# Patient Record
Sex: FEMALE | Employment: UNEMPLOYED | URBAN - METROPOLITAN AREA
[De-identification: names, ages, dates, MRNs, and addresses within clinical notes are randomized per-mention and may not be internally consistent; named-entity substitution may affect disease eponyms.]

---

## 2017-10-21 ENCOUNTER — HOSPITAL ENCOUNTER (OUTPATIENT)
Dept: ULTRASOUND IMAGING | Facility: HOSPITAL | Age: 32
Discharge: HOME | End: 2017-10-21
Attending: NURSE PRACTITIONER

## 2017-10-21 ENCOUNTER — HOSPITAL ENCOUNTER (OUTPATIENT)
Dept: CT IMAGING | Facility: HOSPITAL | Age: 32
Discharge: HOME | End: 2017-10-21
Attending: NURSE PRACTITIONER

## 2017-10-21 DIAGNOSIS — R10.9 ABDOMINAL PAIN: ICD-10-CM

## 2017-10-21 PROCEDURE — 76700 US EXAM ABDOM COMPLETE: CPT | Performed by: NURSE PRACTITIONER

## 2017-10-27 ENCOUNTER — HOSPITAL ENCOUNTER (OUTPATIENT)
Dept: MAMMOGRAPHY | Age: 32
Discharge: HOME | End: 2017-10-27
Attending: NURSE PRACTITIONER
Payer: COMMERCIAL

## 2017-10-27 DIAGNOSIS — Z12.39 ENCOUNTER FOR SPECIAL SCREENING EXAMINATION FOR NEOPLASM OF BREAST: ICD-10-CM

## 2017-10-27 PROCEDURE — 77067 SCR MAMMO BI INCL CAD: CPT | Performed by: NURSE PRACTITIONER

## 2018-03-16 ENCOUNTER — OFFICE VISIT (OUTPATIENT)
Dept: OBGYN CLINIC | Facility: CLINIC | Age: 33
End: 2018-03-16

## 2018-03-16 VITALS — DIASTOLIC BLOOD PRESSURE: 80 MMHG | HEART RATE: 85 BPM | SYSTOLIC BLOOD PRESSURE: 117 MMHG | WEIGHT: 138 LBS

## 2018-03-16 DIAGNOSIS — N92.6 MISSED MENSES: Primary | ICD-10-CM

## 2018-03-16 LAB
CONTROL LINE PRESENT WITH A CLEAR BACKGROUND (YES/NO): YES YES/NO
KIT LOT #: NORMAL NUMERIC

## 2018-03-16 PROCEDURE — 81025 URINE PREGNANCY TEST: CPT | Performed by: OBSTETRICS & GYNECOLOGY

## 2018-03-16 PROCEDURE — 99203 OFFICE O/P NEW LOW 30 MIN: CPT | Performed by: OBSTETRICS & GYNECOLOGY

## 2018-03-16 RX ORDER — FERROUS SULFATE 325(65) MG
325 TABLET ORAL
Qty: 30 TABLET | Refills: 5 | Status: SHIPPED | OUTPATIENT
Start: 2018-03-16 | End: 2018-07-12

## 2018-03-16 RX ORDER — FERROUS SULFATE 325(65) MG
TABLET ORAL
Refills: 7 | COMMUNITY
Start: 2018-02-19 | End: 2018-06-13

## 2018-03-22 ENCOUNTER — NURSE ONLY (OUTPATIENT)
Dept: OBGYN CLINIC | Facility: CLINIC | Age: 33
End: 2018-03-22

## 2018-03-22 VITALS — HEIGHT: 55.5 IN

## 2018-03-22 DIAGNOSIS — Z86.32 HISTORY OF GESTATIONAL DIABETES IN PRIOR PREGNANCY, CURRENTLY PREGNANT: ICD-10-CM

## 2018-03-22 DIAGNOSIS — O09.299 HISTORY OF GESTATIONAL DIABETES IN PRIOR PREGNANCY, CURRENTLY PREGNANT: ICD-10-CM

## 2018-03-22 DIAGNOSIS — Z34.82 ENCOUNTER FOR SUPERVISION OF OTHER NORMAL PREGNANCY IN SECOND TRIMESTER: Primary | ICD-10-CM

## 2018-03-22 PROCEDURE — 99211 OFF/OP EST MAY X REQ PHY/QHP: CPT | Performed by: OBSTETRICS & GYNECOLOGY

## 2018-03-22 RX ORDER — PRENATAL VIT/IRON FUM/FOLIC AC 27MG-0.8MG
1 TABLET ORAL DAILY
COMMUNITY
End: 2018-05-10

## 2018-03-22 NOTE — PROGRESS NOTES
Pt here today for OB RN  Education visit. Pt currently 13w2d based on LMP. Educational material reviewed including nutrition,exercise, warning signs and precautions. Per pt has hx of gestational diabetes x2.  Primary  , due to breech presentat

## 2018-03-24 ENCOUNTER — LAB ENCOUNTER (OUTPATIENT)
Dept: LAB | Age: 33
End: 2018-03-24
Attending: OBSTETRICS & GYNECOLOGY
Payer: MEDICAID

## 2018-03-24 DIAGNOSIS — Z86.32 HISTORY OF GESTATIONAL DIABETES IN PRIOR PREGNANCY, CURRENTLY PREGNANT: ICD-10-CM

## 2018-03-24 DIAGNOSIS — Z34.82 ENCOUNTER FOR SUPERVISION OF OTHER NORMAL PREGNANCY IN SECOND TRIMESTER: ICD-10-CM

## 2018-03-24 DIAGNOSIS — O09.299 HISTORY OF GESTATIONAL DIABETES IN PRIOR PREGNANCY, CURRENTLY PREGNANT: ICD-10-CM

## 2018-03-24 LAB
ANTIBODY SCREEN: NEGATIVE
BASOPHILS # BLD: 0 K/UL (ref 0–0.2)
BASOPHILS NFR BLD: 0 %
BILIRUB UR QL: NEGATIVE
COLOR UR: YELLOW
EOSINOPHIL # BLD: 0.1 K/UL (ref 0–0.7)
EOSINOPHIL NFR BLD: 1 %
ERYTHROCYTE [DISTWIDTH] IN BLOOD BY AUTOMATED COUNT: 14.7 % (ref 11–15)
GLUCOSE 1H P 50 G GLC PO SERPL-MCNC: 206 MG/DL
GLUCOSE UR-MCNC: NEGATIVE MG/DL
HCT VFR BLD AUTO: 33.7 % (ref 35–48)
HGB BLD-MCNC: 11.3 G/DL (ref 12–16)
HGB UR QL STRIP.AUTO: NEGATIVE
KETONES UR-MCNC: NEGATIVE MG/DL
LYMPHOCYTES # BLD: 1.3 K/UL (ref 1–4)
LYMPHOCYTES NFR BLD: 18 %
MCH RBC QN AUTO: 27.2 PG (ref 27–32)
MCHC RBC AUTO-ENTMCNC: 33.5 G/DL (ref 32–37)
MCV RBC AUTO: 81.1 FL (ref 80–100)
MONOCYTES # BLD: 0.3 K/UL (ref 0–1)
MONOCYTES NFR BLD: 5 %
NEUTROPHILS # BLD AUTO: 5.4 K/UL (ref 1.8–7.7)
NEUTROPHILS NFR BLD: 76 %
NITRITE UR QL STRIP.AUTO: NEGATIVE
PH UR: 7 [PH] (ref 5–8)
PLATELET # BLD AUTO: 146 K/UL (ref 140–400)
PMV BLD AUTO: 11 FL (ref 7.4–10.3)
PROT UR-MCNC: NEGATIVE MG/DL
RBC # BLD AUTO: 4.15 M/UL (ref 3.7–5.4)
RBC #/AREA URNS AUTO: 1 /HPF
RH BLOOD TYPE: POSITIVE
RUBV IGG SER-ACNC: 11 IU/ML
SP GR UR STRIP: 1.02 (ref 1–1.03)
TSH SERPL-ACNC: 1.18 UIU/ML (ref 0.45–5.33)
UROBILINOGEN UR STRIP-ACNC: <2
VIT C UR-MCNC: NEGATIVE MG/DL
WBC # BLD AUTO: 7.1 K/UL (ref 4–11)
WBC #/AREA URNS AUTO: 1 /HPF

## 2018-03-24 PROCEDURE — 85025 COMPLETE CBC W/AUTO DIFF WBC: CPT

## 2018-03-24 PROCEDURE — 86850 RBC ANTIBODY SCREEN: CPT

## 2018-03-24 PROCEDURE — 84443 ASSAY THYROID STIM HORMONE: CPT

## 2018-03-24 PROCEDURE — 86780 TREPONEMA PALLIDUM: CPT

## 2018-03-24 PROCEDURE — 86901 BLOOD TYPING SEROLOGIC RH(D): CPT

## 2018-03-24 PROCEDURE — 81001 URINALYSIS AUTO W/SCOPE: CPT

## 2018-03-24 PROCEDURE — 86900 BLOOD TYPING SEROLOGIC ABO: CPT

## 2018-03-24 PROCEDURE — 82950 GLUCOSE TEST: CPT

## 2018-03-24 PROCEDURE — 87389 HIV-1 AG W/HIV-1&-2 AB AG IA: CPT

## 2018-03-24 PROCEDURE — 86762 RUBELLA ANTIBODY: CPT

## 2018-03-24 PROCEDURE — 87340 HEPATITIS B SURFACE AG IA: CPT

## 2018-03-24 PROCEDURE — 36415 COLL VENOUS BLD VENIPUNCTURE: CPT

## 2018-03-24 PROCEDURE — 87086 URINE CULTURE/COLONY COUNT: CPT

## 2018-03-26 LAB
HBV SURFACE AG SERPL QL IA: NONREACTIVE
HIV1+2 AB SERPL QL IA: NONREACTIVE
T PALLIDUM AB SER QL: NEGATIVE

## 2018-04-14 ENCOUNTER — INITIAL PRENATAL (OUTPATIENT)
Dept: OBGYN CLINIC | Facility: CLINIC | Age: 33
End: 2018-04-14

## 2018-04-14 VITALS
SYSTOLIC BLOOD PRESSURE: 109 MMHG | WEIGHT: 162 LBS | BODY MASS INDEX: 37 KG/M2 | HEART RATE: 85 BPM | DIASTOLIC BLOOD PRESSURE: 73 MMHG

## 2018-04-14 DIAGNOSIS — Z34.82 ENCOUNTER FOR SUPERVISION OF OTHER NORMAL PREGNANCY IN SECOND TRIMESTER: Primary | ICD-10-CM

## 2018-04-14 PROBLEM — O99.210 OBESITY AFFECTING PREGNANCY, ANTEPARTUM: Status: ACTIVE | Noted: 2018-04-14

## 2018-04-14 PROBLEM — O34.219 PREVIOUS CESAREAN DELIVERY AFFECTING PREGNANCY: Status: ACTIVE | Noted: 2018-04-14

## 2018-04-14 PROBLEM — Z30.2 REQUEST FOR STERILIZATION: Status: ACTIVE | Noted: 2018-04-14

## 2018-04-14 PROBLEM — O99.210 OBESITY AFFECTING PREGNANCY, ANTEPARTUM (HCC): Status: ACTIVE | Noted: 2018-04-14

## 2018-04-14 PROBLEM — O34.219 PREVIOUS CESAREAN DELIVERY AFFECTING PREGNANCY (HCC): Status: ACTIVE | Noted: 2018-04-14

## 2018-04-14 PROCEDURE — 0500F INITIAL PRENATAL CARE VISIT: CPT | Performed by: OBSTETRICS & GYNECOLOGY

## 2018-04-14 PROCEDURE — 81003 URINALYSIS AUTO W/O SCOPE: CPT | Performed by: OBSTETRICS & GYNECOLOGY

## 2018-04-14 NOTE — PROGRESS NOTES
Patient is unsure of QUAD screen, per pt declined with past pregnancies.  Patient would like more information in regards to QUAD screen

## 2018-04-14 NOTE — PROGRESS NOTES
No c/o. Thinking about Quad screen. Counseled. Interested in repeat c section and tubal sterilization. Discussed salpingectomy. Refer to DM center for GDM diet and monitoring. Check Hgb A1C. Says one year ago had neg DM screening.   rec obtain u/s re

## 2018-04-20 ENCOUNTER — APPOINTMENT (OUTPATIENT)
Dept: LAB | Age: 33
End: 2018-04-20
Attending: OBSTETRICS & GYNECOLOGY
Payer: MEDICAID

## 2018-04-20 DIAGNOSIS — Z34.82 ENCOUNTER FOR SUPERVISION OF OTHER NORMAL PREGNANCY IN SECOND TRIMESTER: ICD-10-CM

## 2018-04-20 PROCEDURE — 81511 FTL CGEN ABNOR FOUR ANAL: CPT

## 2018-04-20 PROCEDURE — 36415 COLL VENOUS BLD VENIPUNCTURE: CPT

## 2018-04-20 PROCEDURE — 83036 HEMOGLOBIN GLYCOSYLATED A1C: CPT

## 2018-04-24 ENCOUNTER — HOSPITAL ENCOUNTER (OUTPATIENT)
Dept: ENDOCRINOLOGY | Facility: HOSPITAL | Age: 33
Discharge: HOME | End: 2018-04-24
Attending: OBSTETRICS & GYNECOLOGY
Payer: MEDICAID

## 2018-04-24 VITALS — BODY MASS INDEX: 38 KG/M2 | WEIGHT: 165.13 LBS

## 2018-04-24 DIAGNOSIS — O24.410 DIET CONTROLLED GESTATIONAL DIABETES MELLITUS (GDM) IN SECOND TRIMESTER: Primary | ICD-10-CM

## 2018-04-24 NOTE — PROGRESS NOTES
Naveen Tijerina  : 1985 was seen for Gestational Diabetes Counseling: Individual/Group    Date: 2018   Start time: 9:40 am  End time: 11:20 am    Obtained usual diet history: no records    Education:     GDM Overview:  Reviewed gestation concerns. Patient verbalized understanding and has no further questions at this time.     Tory Bucio RN, CDE

## 2018-04-28 ENCOUNTER — HOSPITAL ENCOUNTER (OUTPATIENT)
Dept: ULTRASOUND IMAGING | Facility: HOSPITAL | Age: 33
Discharge: HOME | End: 2018-04-28
Attending: OBSTETRICS & GYNECOLOGY
Payer: MEDICAID

## 2018-04-28 DIAGNOSIS — Z34.82 ENCOUNTER FOR SUPERVISION OF OTHER NORMAL PREGNANCY IN SECOND TRIMESTER: ICD-10-CM

## 2018-04-28 PROCEDURE — 76805 OB US >/= 14 WKS SNGL FETUS: CPT | Performed by: OBSTETRICS & GYNECOLOGY

## 2018-04-30 ENCOUNTER — TELEPHONE (OUTPATIENT)
Dept: OBGYN CLINIC | Facility: CLINIC | Age: 33
End: 2018-04-30

## 2018-04-30 NOTE — TELEPHONE ENCOUNTER
----- Message from Andi Reinoso MD sent at 4/28/2018  8:35 PM CDT -----  Please inform patient by [address], mail, or call of normal OB ultrasound.

## 2018-05-03 ENCOUNTER — HOSPITAL ENCOUNTER (OUTPATIENT)
Dept: ENDOCRINOLOGY | Facility: HOSPITAL | Age: 33
Discharge: HOME | End: 2018-05-03
Attending: OBSTETRICS & GYNECOLOGY
Payer: MEDICAID

## 2018-05-03 VITALS — WEIGHT: 167.31 LBS | BODY MASS INDEX: 38 KG/M2

## 2018-05-03 DIAGNOSIS — O24.410 DIET CONTROLLED GESTATIONAL DIABETES MELLITUS (GDM) IN SECOND TRIMESTER: Primary | ICD-10-CM

## 2018-05-03 NOTE — PROGRESS NOTES
[facility]  : 1985 was seen for GDM Follow-Up Counseling    Date: 5/3/2018   Start time: 1:20 pm  End time: 2:20 pm    Assessment: Wt 167 lb 4.8 oz   LMP 2017   BMI 38.19 kg/m²   Weight: Wt Readings from Last 6 Encounters:   needing medication adjustments throughout pregnancy. Reducing Risk:  Discussed management of (hyperglycemia, hypoglycemia) and when to call provider. Recommendations:      1. Follow recommended meal plan.    2. Test blood glucose and ketones as direc

## 2018-05-10 ENCOUNTER — ROUTINE PRENATAL (OUTPATIENT)
Dept: OBGYN CLINIC | Facility: CLINIC | Age: 33
End: 2018-05-10

## 2018-05-10 VITALS
SYSTOLIC BLOOD PRESSURE: 106 MMHG | BODY MASS INDEX: 38 KG/M2 | HEART RATE: 90 BPM | DIASTOLIC BLOOD PRESSURE: 70 MMHG | WEIGHT: 167 LBS

## 2018-05-10 DIAGNOSIS — Z34.82 ENCOUNTER FOR SUPERVISION OF OTHER NORMAL PREGNANCY IN SECOND TRIMESTER: Primary | ICD-10-CM

## 2018-05-10 PROCEDURE — 81003 URINALYSIS AUTO W/O SCOPE: CPT | Performed by: OBSTETRICS & GYNECOLOGY

## 2018-05-10 PROCEDURE — 0502F SUBSEQUENT PRENATAL CARE: CPT | Performed by: OBSTETRICS & GYNECOLOGY

## 2018-05-10 RX ORDER — PRENATAL VIT/IRON FUM/FOLIC AC 27MG-0.8MG
1 TABLET ORAL DAILY
Qty: 30 TABLET | Refills: 4 | Status: SHIPPED | OUTPATIENT
Start: 2018-05-10 | End: 2018-06-13

## 2018-05-19 ENCOUNTER — TELEPHONE (OUTPATIENT)
Dept: OBGYN CLINIC | Facility: CLINIC | Age: 33
End: 2018-05-19

## 2018-05-19 RX ORDER — MULTIVIT-MIN 60/IRON FUM/FOLIC 27 MG-1 MG
1 TABLET ORAL DAILY
Qty: 90 TABLET | Refills: 3 | Status: SHIPPED | OUTPATIENT
Start: 2018-05-19 | End: 2018-06-18

## 2018-05-19 NOTE — TELEPHONE ENCOUNTER
[phone]  Pt desires to try new pnv due to constipation, sent in volplus for pt to try, also informed she could try colace

## 2018-05-25 ENCOUNTER — ROUTINE PRENATAL (OUTPATIENT)
Dept: OBGYN CLINIC | Facility: CLINIC | Age: 33
End: 2018-05-25

## 2018-05-25 VITALS
BODY MASS INDEX: 38 KG/M2 | HEART RATE: 91 BPM | DIASTOLIC BLOOD PRESSURE: 72 MMHG | WEIGHT: 167 LBS | SYSTOLIC BLOOD PRESSURE: 109 MMHG

## 2018-05-25 DIAGNOSIS — Z34.82 ENCOUNTER FOR SUPERVISION OF OTHER NORMAL PREGNANCY IN SECOND TRIMESTER: Primary | ICD-10-CM

## 2018-05-25 PROCEDURE — 0502F SUBSEQUENT PRENATAL CARE: CPT | Performed by: OBSTETRICS & GYNECOLOGY

## 2018-05-25 PROCEDURE — 81003 URINALYSIS AUTO W/O SCOPE: CPT | Performed by: OBSTETRICS & GYNECOLOGY

## 2018-06-06 ENCOUNTER — HOSPITAL ENCOUNTER (OUTPATIENT)
Dept: ENDOCRINOLOGY | Facility: HOSPITAL | Age: 33
Discharge: HOME | End: 2018-06-06
Attending: OBSTETRICS & GYNECOLOGY
Payer: MEDICAID

## 2018-06-06 VITALS — WEIGHT: 167 LBS | BODY MASS INDEX: 38 KG/M2

## 2018-06-06 DIAGNOSIS — O24.410 DIET CONTROLLED GESTATIONAL DIABETES MELLITUS (GDM) IN SECOND TRIMESTER: Primary | ICD-10-CM

## 2018-06-06 NOTE — PROGRESS NOTES
Rom Oseguera  : 1985 was seen for GDM Follow-Up Counseling    Date: 2018   Start time: 2:35 pm End time: 3:15 pm    Assessment: Wt 167 lb   LMP 2017   BMI 38.12 kg/m²   Weight: Wt Readings from Last 6 Encounters:  18 : 167 RN, TASHAE

## 2018-06-13 ENCOUNTER — TELEPHONE (OUTPATIENT)
Dept: OBGYN CLINIC | Facility: CLINIC | Age: 33
End: 2018-06-13

## 2018-06-13 ENCOUNTER — ROUTINE PRENATAL (OUTPATIENT)
Dept: OBGYN CLINIC | Facility: CLINIC | Age: 33
End: 2018-06-13

## 2018-06-13 VITALS
DIASTOLIC BLOOD PRESSURE: 68 MMHG | SYSTOLIC BLOOD PRESSURE: 102 MMHG | BODY MASS INDEX: 38 KG/M2 | HEART RATE: 80 BPM | WEIGHT: 165 LBS

## 2018-06-13 DIAGNOSIS — Z34.82 ENCOUNTER FOR SUPERVISION OF OTHER NORMAL PREGNANCY IN SECOND TRIMESTER: Primary | ICD-10-CM

## 2018-06-13 DIAGNOSIS — Z11.3 ROUTINE SCREENING FOR STI (SEXUALLY TRANSMITTED INFECTION): ICD-10-CM

## 2018-06-13 DIAGNOSIS — N89.8 VAGINAL DISCHARGE: ICD-10-CM

## 2018-06-13 PROCEDURE — 87210 SMEAR WET MOUNT SALINE/INK: CPT | Performed by: ADVANCED PRACTICE MIDWIFE

## 2018-06-13 PROCEDURE — 81002 URINALYSIS NONAUTO W/O SCOPE: CPT | Performed by: ADVANCED PRACTICE MIDWIFE

## 2018-06-13 PROCEDURE — 99213 OFFICE O/P EST LOW 20 MIN: CPT | Performed by: ADVANCED PRACTICE MIDWIFE

## 2018-06-13 RX ORDER — METRONIDAZOLE 500 MG/1
500 TABLET ORAL 2 TIMES DAILY
Qty: 14 TABLET | Refills: 0 | Status: ON HOLD | OUTPATIENT
Start: 2018-06-13 | End: 2018-09-16

## 2018-06-13 NOTE — PROGRESS NOTES
Yahir Valera has a greenish vaginal d/c and with sex had some spotting. Baby is active. Occasional mild abdominal pain  O.  Vulva -normal female w/o lesion,edema or erythema  Vagina - green d/c, +whiff, clue and hyphae  Cx - long and closed.   Contact bleed

## 2018-06-13 NOTE — TELEPHONE ENCOUNTER
Pt 25 weeks c/o pinkish spotting Last Wednesday and yesturday after intercourse  Also reported some yellowish-greenish discharge as well and reported either increase in d/c or possible clear fluid.  Has changed damp panti liner twice since yesterday evening

## 2018-06-20 ENCOUNTER — TELEPHONE (OUTPATIENT)
Dept: OBGYN CLINIC | Facility: CLINIC | Age: 33
End: 2018-06-20

## 2018-06-20 NOTE — TELEPHONE ENCOUNTER
----- Message from BUFFY Arellano sent at 6/18/2018 11:30 AM CDT -----  Pt was appropriately treated at appt for both candida and BVp

## 2018-06-30 ENCOUNTER — ROUTINE PRENATAL (OUTPATIENT)
Dept: OBGYN CLINIC | Facility: CLINIC | Age: 33
End: 2018-06-30

## 2018-06-30 ENCOUNTER — TELEPHONE (OUTPATIENT)
Dept: OBGYN CLINIC | Facility: CLINIC | Age: 33
End: 2018-06-30

## 2018-06-30 VITALS
SYSTOLIC BLOOD PRESSURE: 100 MMHG | WEIGHT: 163 LBS | HEART RATE: 85 BPM | BODY MASS INDEX: 37 KG/M2 | DIASTOLIC BLOOD PRESSURE: 65 MMHG

## 2018-06-30 DIAGNOSIS — O24.419 GESTATIONAL DIABETES MELLITUS (GDM) AFFECTING PREGNANCY: ICD-10-CM

## 2018-06-30 DIAGNOSIS — Z34.83 ENCOUNTER FOR SUPERVISION OF OTHER NORMAL PREGNANCY IN THIRD TRIMESTER: Primary | ICD-10-CM

## 2018-06-30 PROCEDURE — 0502F SUBSEQUENT PRENATAL CARE: CPT | Performed by: OBSTETRICS & GYNECOLOGY

## 2018-06-30 PROCEDURE — 81002 URINALYSIS NONAUTO W/O SCOPE: CPT | Performed by: OBSTETRICS & GYNECOLOGY

## 2018-06-30 RX ORDER — VITAMIN A ACETATE, BETA CAROTENE, ASCORBIC ACID, CHOLECALCIFEROL, .ALPHA.-TOCOPHEROL ACETATE, DL-, THIAMINE MONONITRATE, RIBOFLAVIN, NIACINAMIDE, PYRIDOXINE HYDROCHLORIDE, FOLIC ACID, CYANOCOBALAMIN, CALCIUM CARBONATE, FERROUS FUMARATE, ZINC OXIDE, CUPRIC OXIDE 3080; 12; 120; 400; 1; 1.84; 3; 20; 22; 920; 25; 200; 27; 10; 2 [IU]/1; UG/1; MG/1; [IU]/1; MG/1; MG/1; MG/1; MG/1; MG/1; [IU]/1; MG/1; MG/1; MG/1; MG/1; MG/1
1 TABLET, FILM COATED ORAL DAILY
Qty: 90 TABLET | Refills: 3 | Status: SHIPPED | OUTPATIENT
Start: 2018-06-30 | End: 2018-07-30

## 2018-06-30 NOTE — PROGRESS NOTES
No c/o. Good fm  Daily elevated FBS ~100. Rx NPH 3 U insulin hs. Schedule DM center counseling appt asap. FU one week.

## 2018-06-30 NOTE — TELEPHONE ENCOUNTER
Talked to Efrain Mejias from [facility] in [place] on [address]. NPH insulin 3 units at HS ordered for pt.

## 2018-06-30 NOTE — TELEPHONE ENCOUNTER
06/30/2018 Pr Dr. Khloe Payton Pt needs to start on 3 units of NPH Insulin at bedtime. Please [facility] RN prescription needs to be called in to her Pharmacy.

## 2018-07-03 ENCOUNTER — TELEPHONE (OUTPATIENT)
Dept: ENDOCRINOLOGY | Facility: HOSPITAL | Age: 33
End: 2018-07-03

## 2018-07-05 ENCOUNTER — ROUTINE PRENATAL (OUTPATIENT)
Dept: OBGYN CLINIC | Facility: CLINIC | Age: 33
End: 2018-07-05

## 2018-07-05 VITALS
DIASTOLIC BLOOD PRESSURE: 64 MMHG | SYSTOLIC BLOOD PRESSURE: 99 MMHG | WEIGHT: 168.19 LBS | BODY MASS INDEX: 38 KG/M2 | HEART RATE: 86 BPM

## 2018-07-05 DIAGNOSIS — Z34.83 ENCOUNTER FOR SUPERVISION OF OTHER NORMAL PREGNANCY IN THIRD TRIMESTER: Primary | ICD-10-CM

## 2018-07-05 PROBLEM — O24.414 INSULIN CONTROLLED WHITE CLASSIFICATION A2 GESTATIONAL DIABETES MELLITUS (GDM): Status: ACTIVE | Noted: 2018-04-24

## 2018-07-05 PROBLEM — O24.414 INSULIN CONTROLLED WHITE CLASSIFICATION A2 GESTATIONAL DIABETES MELLITUS (GDM) (HCC): Status: ACTIVE | Noted: 2018-04-24

## 2018-07-05 LAB
GLUCOSE (URINE DIPSTICK): 500 MG/DL
KETONES (URINE DIPSTICK): 15 MG/DL
MULTISTIX LOT#: NORMAL NUMERIC
PH, URINE: 7 (ref 4.5–8)
SPECIFIC GRAVITY: 1.02 (ref 1–1.03)
URINE-COLOR: YELLOW
UROBILINOGEN,SEMI-QN: 1 MG/DL (ref 0–1.9)

## 2018-07-05 PROCEDURE — 81002 URINALYSIS NONAUTO W/O SCOPE: CPT | Performed by: OBSTETRICS & GYNECOLOGY

## 2018-07-05 PROCEDURE — 0502F SUBSEQUENT PRENATAL CARE: CPT | Performed by: OBSTETRICS & GYNECOLOGY

## 2018-07-05 RX ORDER — PNV NO.95/FERROUS FUM/FOLIC AC 28MG-0.8MG
TABLET ORAL
Refills: 0 | COMMUNITY
Start: 2018-05-12 | End: 2018-08-06

## 2018-07-05 RX ORDER — INSULIN HUMAN 100 [IU]/ML
INJECTION, SUSPENSION SUBCUTANEOUS
Refills: 0 | COMMUNITY
Start: 2018-06-30 | End: 2018-08-20

## 2018-07-05 RX ORDER — IBUPROFEN 200 MG
TABLET ORAL
Refills: 0 | COMMUNITY
Start: 2018-06-30 | End: 2018-09-27

## 2018-07-05 NOTE — PROGRESS NOTES
Patient starting her 3 Units of NPH tonight as per Dr Xiomara Funk. Patient forgot her log today. For Diabetic education for giving self Insulin today.

## 2018-07-06 ENCOUNTER — HOSPITAL ENCOUNTER (OUTPATIENT)
Dept: ENDOCRINOLOGY | Facility: HOSPITAL | Age: 33
Discharge: HOME | End: 2018-07-06
Attending: OBSTETRICS & GYNECOLOGY
Payer: MEDICAID

## 2018-07-06 VITALS — BODY MASS INDEX: 38 KG/M2 | WEIGHT: 167.88 LBS

## 2018-07-06 DIAGNOSIS — O24.414 INSULIN CONTROLLED WHITE CLASSIFICATION A2 GESTATIONAL DIABETES MELLITUS (GDM): Primary | ICD-10-CM

## 2018-07-06 NOTE — PROGRESS NOTES
[facility]  : 1985 was seen for GDM Follow-Up Counseling    Date: 2018   Start time: 1 pm  End time: 1:30 pm    Assessment: Wt 167 lb 14.4 oz   LMP 2017   BMI 38.32 kg/m²   Weight: Wt Readings from Last 6 Encounters:  18 time.    Gagan Cr RN, CDE

## 2018-07-12 ENCOUNTER — ROUTINE PRENATAL (OUTPATIENT)
Dept: OBGYN CLINIC | Facility: CLINIC | Age: 33
End: 2018-07-12

## 2018-07-12 VITALS
SYSTOLIC BLOOD PRESSURE: 106 MMHG | HEIGHT: 55.5 IN | WEIGHT: 169 LBS | BODY MASS INDEX: 38.56 KG/M2 | HEART RATE: 78 BPM | DIASTOLIC BLOOD PRESSURE: 67 MMHG

## 2018-07-12 DIAGNOSIS — Z34.83 PRENATAL CARE, SUBSEQUENT PREGNANCY, THIRD TRIMESTER: Primary | ICD-10-CM

## 2018-07-12 LAB
APPEARANCE: CLEAR
MULTISTIX LOT#: NORMAL NUMERIC
PH, URINE: 7.5 (ref 4.5–8)
SPECIFIC GRAVITY: 1 (ref 1–1.03)
URINE-COLOR: YELLOW
UROBILINOGEN,SEMI-QN: 0.2 MG/DL (ref 0–1.9)

## 2018-07-12 PROCEDURE — 0502F SUBSEQUENT PRENATAL CARE: CPT | Performed by: OBSTETRICS & GYNECOLOGY

## 2018-07-12 PROCEDURE — 81002 URINALYSIS NONAUTO W/O SCOPE: CPT | Performed by: OBSTETRICS & GYNECOLOGY

## 2018-07-12 RX ORDER — FERROUS SULFATE 325(65) MG
325 TABLET ORAL
Qty: 30 TABLET | Refills: 5 | Status: SHIPPED | OUTPATIENT
Start: 2018-07-12 | End: 2019-07-12

## 2018-07-12 NOTE — PROGRESS NOTES
Reports good fetal movements blood sugars have normalized. Fasting levels are normal less than 89. Taking NPH insulin 3 units at bedtime. Needs to do 28 week labs without Glucola. Plan weekly NSTs starting at 32 weeks.   Plan ultrasound for growth at 36

## 2018-07-14 ENCOUNTER — LAB ENCOUNTER (OUTPATIENT)
Dept: LAB | Age: 33
End: 2018-07-14
Attending: OBSTETRICS & GYNECOLOGY
Payer: MEDICAID

## 2018-07-14 DIAGNOSIS — Z34.83 ENCOUNTER FOR SUPERVISION OF OTHER NORMAL PREGNANCY IN THIRD TRIMESTER: ICD-10-CM

## 2018-07-14 LAB
BASOPHILS # BLD: 0 K/UL (ref 0–0.2)
BASOPHILS NFR BLD: 0 %
EOSINOPHIL # BLD: 0 K/UL (ref 0–0.7)
EOSINOPHIL NFR BLD: 1 %
ERYTHROCYTE [DISTWIDTH] IN BLOOD BY AUTOMATED COUNT: 15.7 % (ref 11–15)
HCT VFR BLD AUTO: 33.8 % (ref 35–48)
HGB BLD-MCNC: 11.1 G/DL (ref 12–16)
LYMPHOCYTES # BLD: 1.1 K/UL (ref 1–4)
LYMPHOCYTES NFR BLD: 17 %
MCH RBC QN AUTO: 27.1 PG (ref 27–32)
MCHC RBC AUTO-ENTMCNC: 32.9 G/DL (ref 32–37)
MCV RBC AUTO: 82.3 FL (ref 80–100)
MONOCYTES # BLD: 0.4 K/UL (ref 0–1)
MONOCYTES NFR BLD: 7 %
NEUTROPHILS # BLD AUTO: 5.2 K/UL (ref 1.8–7.7)
NEUTROPHILS NFR BLD: 76 %
PLATELET # BLD AUTO: 133 K/UL (ref 140–400)
PMV BLD AUTO: 9.9 FL (ref 7.4–10.3)
RBC # BLD AUTO: 4.1 M/UL (ref 3.7–5.4)
WBC # BLD AUTO: 6.8 K/UL (ref 4–11)

## 2018-07-14 PROCEDURE — 36415 COLL VENOUS BLD VENIPUNCTURE: CPT

## 2018-07-14 PROCEDURE — 85025 COMPLETE CBC W/AUTO DIFF WBC: CPT

## 2018-07-17 ENCOUNTER — TELEPHONE (OUTPATIENT)
Dept: OBGYN CLINIC | Facility: CLINIC | Age: 33
End: 2018-07-17

## 2018-07-17 DIAGNOSIS — Z34.83 ENCOUNTER FOR SUPERVISION OF OTHER NORMAL PREGNANCY IN THIRD TRIMESTER: Primary | ICD-10-CM

## 2018-07-17 NOTE — TELEPHONE ENCOUNTER
----- Message from Cheng Rivera MD sent at 7/16/2018 10:55 AM CDT -----  Please inform that her platelet count is mildly lower than normal.  Recommend rechecking 34 weeks. Can be done as an early 34-35 week routine testing.

## 2018-07-17 NOTE — TELEPHONE ENCOUNTER
Argentine phone line  #[phone] used to translate phone call. Message left on pt's voicemail regarding Dr. María Lee message below. Pt can call back at [phone].

## 2018-07-27 ENCOUNTER — ROUTINE PRENATAL (OUTPATIENT)
Dept: OBGYN CLINIC | Facility: CLINIC | Age: 33
End: 2018-07-27
Payer: MEDICAID

## 2018-07-27 VITALS — BODY MASS INDEX: 39 KG/M2 | SYSTOLIC BLOOD PRESSURE: 100 MMHG | DIASTOLIC BLOOD PRESSURE: 68 MMHG | WEIGHT: 169 LBS

## 2018-07-27 DIAGNOSIS — Z34.83 ENCOUNTER FOR SUPERVISION OF OTHER NORMAL PREGNANCY IN THIRD TRIMESTER: ICD-10-CM

## 2018-07-27 DIAGNOSIS — O99.210 OBESITY AFFECTING PREGNANCY, ANTEPARTUM: ICD-10-CM

## 2018-07-27 DIAGNOSIS — O34.219 PREVIOUS CESAREAN DELIVERY AFFECTING PREGNANCY: ICD-10-CM

## 2018-07-27 DIAGNOSIS — O24.414 INSULIN CONTROLLED WHITE CLASSIFICATION A2 GESTATIONAL DIABETES MELLITUS (GDM): Primary | ICD-10-CM

## 2018-07-27 LAB
APPEARANCE: CLEAR
BILIRUBIN: NEGATIVE
GLUCOSE (URINE DIPSTICK): NEGATIVE MG/DL
KETONES (URINE DIPSTICK): NEGATIVE MG/DL
MULTISTIX LOT#: NORMAL NUMERIC
NITRITE, URINE: NEGATIVE
OCCULT BLOOD: NEGATIVE
PH, URINE: 6.5 (ref 4.5–8)
PROTEIN (URINE DIPSTICK): NEGATIVE MG/DL
SPECIFIC GRAVITY: 1.01 (ref 1–1.03)
URINE-COLOR: YELLOW
UROBILINOGEN,SEMI-QN: 0 MG/DL (ref 0–1.9)

## 2018-07-27 PROCEDURE — 0502F SUBSEQUENT PRENATAL CARE: CPT | Performed by: OBSTETRICS & GYNECOLOGY

## 2018-07-27 PROCEDURE — 81002 URINALYSIS NONAUTO W/O SCOPE: CPT | Performed by: OBSTETRICS & GYNECOLOGY

## 2018-07-27 NOTE — PROGRESS NOTES
[facility]  Obstetrics and Gynecology  Prenatal Visit  Eric Johnson MD    TAM Garcia is a 28year old.o.  31w3d weeks. Here for routine prenatal visit and is without complaints.   Patient denies any regular uterine contractio Strip 1 strip by Does not apply route daily. Test first morning urine daily.  Disp: 100 strip Rfl: 3     Exam   /68   Wt 169 lb (76.7 kg)   LMP 2017   BMI 38.57 kg/m²     Assessment   Hawa Howard is a 28year old female  with viable IUP at 31w

## 2018-08-06 ENCOUNTER — ROUTINE PRENATAL (OUTPATIENT)
Dept: OBGYN CLINIC | Facility: CLINIC | Age: 33
End: 2018-08-06
Payer: MEDICAID

## 2018-08-06 ENCOUNTER — TELEPHONE (OUTPATIENT)
Dept: OBGYN CLINIC | Facility: CLINIC | Age: 33
End: 2018-08-06

## 2018-08-06 VITALS
BODY MASS INDEX: 39 KG/M2 | DIASTOLIC BLOOD PRESSURE: 69 MMHG | HEART RATE: 90 BPM | WEIGHT: 170 LBS | SYSTOLIC BLOOD PRESSURE: 105 MMHG

## 2018-08-06 DIAGNOSIS — O24.419 GESTATIONAL DIABETES MELLITUS (GDM) IN THIRD TRIMESTER, GESTATIONAL DIABETES METHOD OF CONTROL UNSPECIFIED: ICD-10-CM

## 2018-08-06 DIAGNOSIS — Z34.83 ENCOUNTER FOR SUPERVISION OF OTHER NORMAL PREGNANCY IN THIRD TRIMESTER: Primary | ICD-10-CM

## 2018-08-06 DIAGNOSIS — Z23 NEED FOR VACCINATION: ICD-10-CM

## 2018-08-06 PROBLEM — O36.8230: Status: ACTIVE | Noted: 2018-08-06

## 2018-08-06 LAB
APPEARANCE: CLEAR
GLUCOSE (URINE DIPSTICK): 250 MG/DL
MULTISTIX LOT#: NORMAL NUMERIC
PH, URINE: 7 (ref 4.5–8)
SPECIFIC GRAVITY: 1.01 (ref 1–1.03)
URINE-COLOR: YELLOW
UROBILINOGEN,SEMI-QN: 0.2 MG/DL (ref 0–1.9)

## 2018-08-06 PROCEDURE — 0502F SUBSEQUENT PRENATAL CARE: CPT | Performed by: ADVANCED PRACTICE MIDWIFE

## 2018-08-06 PROCEDURE — 90715 TDAP VACCINE 7 YRS/> IM: CPT | Performed by: ADVANCED PRACTICE MIDWIFE

## 2018-08-06 PROCEDURE — 90471 IMMUNIZATION ADMIN: CPT | Performed by: ADVANCED PRACTICE MIDWIFE

## 2018-08-06 PROCEDURE — 81002 URINALYSIS NONAUTO W/O SCOPE: CPT | Performed by: ADVANCED PRACTICE MIDWIFE

## 2018-08-06 RX ORDER — PNV,CALCIUM 72/IRON/FOLIC ACID 27 MG-1 MG
TABLET ORAL
Refills: 0 | COMMUNITY
Start: 2018-06-30 | End: 2018-08-20

## 2018-08-06 NOTE — TELEPHONE ENCOUNTER
RN placed call to [employer] at : [phone]. Authorization obtained.     Service CPT code: 30185  Case #: [id number]  Auth#: [id number]  Dates:8/6 - 5/20/2019

## 2018-08-06 NOTE — PROGRESS NOTES
S.  Denies ADAMS, vision change, URQ pain, swelling. Baby is active its a boy. Is employed in a factory and sits and stands. Platelets are low. She is IDDM in this pregnancy and review of BS show all is normal    O.   See above  A.   32 wk S = D  ID

## 2018-08-20 ENCOUNTER — ROUTINE PRENATAL (OUTPATIENT)
Dept: OBGYN CLINIC | Facility: CLINIC | Age: 33
End: 2018-08-20
Payer: MEDICAID

## 2018-08-20 ENCOUNTER — APPOINTMENT (OUTPATIENT)
Dept: LAB | Facility: HOSPITAL | Age: 33
End: 2018-08-20
Attending: OBSTETRICS & GYNECOLOGY
Payer: MEDICAID

## 2018-08-20 ENCOUNTER — TELEPHONE (OUTPATIENT)
Dept: OBGYN CLINIC | Facility: CLINIC | Age: 33
End: 2018-08-20

## 2018-08-20 VITALS
WEIGHT: 173.63 LBS | BODY MASS INDEX: 40 KG/M2 | DIASTOLIC BLOOD PRESSURE: 66 MMHG | SYSTOLIC BLOOD PRESSURE: 102 MMHG | HEART RATE: 85 BPM

## 2018-08-20 DIAGNOSIS — O24.414 INSULIN CONTROLLED GESTATIONAL DIABETES MELLITUS (GDM) IN THIRD TRIMESTER: Primary | ICD-10-CM

## 2018-08-20 DIAGNOSIS — R82.90 ABNORMAL URINALYSIS: ICD-10-CM

## 2018-08-20 DIAGNOSIS — Z34.83 ENCOUNTER FOR SUPERVISION OF OTHER NORMAL PREGNANCY IN THIRD TRIMESTER: Primary | ICD-10-CM

## 2018-08-20 LAB
GLUCOSE (URINE DIPSTICK): 500 MG/DL
MULTISTIX LOT#: NORMAL NUMERIC
MULTISTIX LOT#: NORMAL NUMERIC
PH, URINE: 7.5 (ref 4.5–8)
PH, URINE: 8 (ref 4.5–8)
PROTEIN (URINE DIPSTICK): 30 MG/DL
SPECIFIC GRAVITY: 1.01 (ref 1–1.03)
SPECIFIC GRAVITY: 1.02 (ref 1–1.03)
UROBILINOGEN,SEMI-QN: 0.2 MG/DL (ref 0–1.9)
UROBILINOGEN,SEMI-QN: 0.2 MG/DL (ref 0–1.9)

## 2018-08-20 PROCEDURE — 87086 URINE CULTURE/COLONY COUNT: CPT

## 2018-08-20 PROCEDURE — 0502F SUBSEQUENT PRENATAL CARE: CPT | Performed by: OBSTETRICS & GYNECOLOGY

## 2018-08-20 PROCEDURE — 81002 URINALYSIS NONAUTO W/O SCOPE: CPT | Performed by: OBSTETRICS & GYNECOLOGY

## 2018-08-20 RX ORDER — INSULIN HUMAN 100 [IU]/ML
3 INJECTION, SUSPENSION SUBCUTANEOUS NIGHTLY
Qty: 1 VIAL | Refills: 1 | Status: ON HOLD | OUTPATIENT
Start: 2018-08-20 | End: 2018-09-16

## 2018-08-20 RX ORDER — PNV,CALCIUM 72/IRON/FOLIC ACID 27 MG-1 MG
1 TABLET ORAL DAILY
Qty: 90 TABLET | Refills: 6 | Status: ON HOLD | OUTPATIENT
Start: 2018-08-20 | End: 2018-09-16

## 2018-08-20 NOTE — PROGRESS NOTES
rn spoke with mfm, pt has been scheduled for nst and growth u/s with mfm in 2 days. Good fm. Kick cts.

## 2018-08-22 ENCOUNTER — HOSPITAL ENCOUNTER (OUTPATIENT)
Dept: PERINATAL CARE | Facility: HOSPITAL | Age: 33
Discharge: HOME | End: 2018-08-22
Attending: OBSTETRICS & GYNECOLOGY
Payer: MEDICAID

## 2018-08-22 ENCOUNTER — TELEPHONE (OUTPATIENT)
Dept: OBGYN CLINIC | Facility: CLINIC | Age: 33
End: 2018-08-22

## 2018-08-22 ENCOUNTER — HOSPITAL ENCOUNTER (OUTPATIENT)
Dept: PERINATAL CARE | Facility: HOSPITAL | Age: 33
Discharge: HOME | End: 2018-08-22
Attending: ADVANCED PRACTICE MIDWIFE
Payer: MEDICAID

## 2018-08-22 ENCOUNTER — APPOINTMENT (OUTPATIENT)
Dept: LAB | Facility: HOSPITAL | Age: 33
End: 2018-08-22
Attending: ADVANCED PRACTICE MIDWIFE
Payer: MEDICAID

## 2018-08-22 VITALS — DIASTOLIC BLOOD PRESSURE: 60 MMHG | SYSTOLIC BLOOD PRESSURE: 109 MMHG

## 2018-08-22 DIAGNOSIS — O24.414 INSULIN CONTROLLED WHITE CLASSIFICATION A2 GESTATIONAL DIABETES MELLITUS (GDM): ICD-10-CM

## 2018-08-22 DIAGNOSIS — O99.210 OBESITY AFFECTING PREGNANCY, ANTEPARTUM: ICD-10-CM

## 2018-08-22 DIAGNOSIS — O36.8230: ICD-10-CM

## 2018-08-22 DIAGNOSIS — O34.219 PREVIOUS CESAREAN DELIVERY AFFECTING PREGNANCY: ICD-10-CM

## 2018-08-22 DIAGNOSIS — R39.9 UTI SYMPTOMS: Primary | ICD-10-CM

## 2018-08-22 DIAGNOSIS — R39.9 UTI SYMPTOMS: ICD-10-CM

## 2018-08-22 DIAGNOSIS — O24.414 INSULIN CONTROLLED WHITE CLASSIFICATION A2 GESTATIONAL DIABETES MELLITUS (GDM): Primary | ICD-10-CM

## 2018-08-22 PROCEDURE — 99243 OFF/OP CNSLTJ NEW/EST LOW 30: CPT | Performed by: OBSTETRICS & GYNECOLOGY

## 2018-08-22 PROCEDURE — 87086 URINE CULTURE/COLONY COUNT: CPT

## 2018-08-22 PROCEDURE — 76811 OB US DETAILED SNGL FETUS: CPT | Performed by: OBSTETRICS & GYNECOLOGY

## 2018-08-22 NOTE — TELEPHONE ENCOUNTER
French phone line  #[phone] used to translate phone call. Pt informed that Dr. Juana Odell ordered her a urine culture. Pt voices understanding and will go to the lab today to have test done.

## 2018-08-22 NOTE — PROGRESS NOTES
Reason for Consult:   Dear Dr. Scott Shearer,    Thank you for requesting ultrasound evaluation and maternal fetal medicine consultation on Dakota Aleman. As you are aware she is a 28year old female with a Chung pregnancy at 35w1d.   A maternal-fet (four) times daily. Use as directed. Disp: 1 kit Rfl: 0   Glucose Blood (ELAINA CONTOUR NEXT TEST) In Vitro Strip 1 strip by Other route 4 (four) times daily. Use as directed.  Disp: 100 strip Rfl: 6   Acetone, Urine, Test (KETOSTIX) In Vitro Strip 1 strip b importance of good glycemic control and avoidance of prolonged hypo- and hyperglycemia was addressed. In general good control of blood sugars can help to decrease these risks to a level close to non diabetic patients.     We discussed optimal contol o for allowing me to participate in the care of your patient. Please do not hesitate to call with any questions or concerns. Total patient time was 45 minutes in evaluation, consultation, and coordination of care.   Greater than 50% of this time was spen

## 2018-08-27 ENCOUNTER — TELEPHONE (OUTPATIENT)
Dept: OBGYN CLINIC | Facility: CLINIC | Age: 33
End: 2018-08-27

## 2018-08-27 NOTE — TELEPHONE ENCOUNTER
Please confirm that patient complies with [facility] recommendations:  IMPRESSION:   1. IUP @  35w1d   2. Scan consistent with dates   3. No fetal structural abnormalities seen   4. Diabetes- managed by OB   5.  Previous  x 2     RECOMMENDATIONS:     1.

## 2018-08-28 NOTE — TELEPHONE ENCOUNTER
Confirmed with pt she has only had 1  in past with [facility]  Pt had NST on , but doesn't have anymore scheduled. Clinical staff to contact [facility] regarding correction to documentation and scheduling next NST apt.

## 2018-08-29 ENCOUNTER — TELEPHONE (OUTPATIENT)
Dept: PERINATAL CARE | Facility: HOSPITAL | Age: 33
End: 2018-08-29

## 2018-08-29 NOTE — TELEPHONE ENCOUNTER
Talked to Gomez Low from [place] [address] and informed her pt needs to be scheduled for another NST. Also informed her that Impression note from Dr. [facility] reads pt is a c/s x 2, but pt has only had 1 c/s. Ronald Rahman will let Dr. [facility] know.

## 2018-09-04 NOTE — ADDENDUM NOTE
Encounter addended by:  Ramon Gimenez MD on: 9/4/2018 12:02 PM<BR>    Actions taken: Sign clinical note

## 2018-09-05 ENCOUNTER — TELEPHONE (OUTPATIENT)
Dept: OBGYN CLINIC | Facility: CLINIC | Age: 33
End: 2018-09-05

## 2018-09-05 ENCOUNTER — ROUTINE PRENATAL (OUTPATIENT)
Dept: OBGYN CLINIC | Facility: CLINIC | Age: 33
End: 2018-09-05
Payer: MEDICAID

## 2018-09-05 VITALS
HEART RATE: 77 BPM | SYSTOLIC BLOOD PRESSURE: 108 MMHG | DIASTOLIC BLOOD PRESSURE: 71 MMHG | BODY MASS INDEX: 41 KG/M2 | WEIGHT: 178 LBS

## 2018-09-05 DIAGNOSIS — Z34.83 ENCOUNTER FOR SUPERVISION OF OTHER NORMAL PREGNANCY IN THIRD TRIMESTER: Primary | ICD-10-CM

## 2018-09-05 LAB
APPEARANCE: CLEAR
MULTISTIX LOT#: NORMAL NUMERIC
PH, URINE: 7 (ref 4.5–8)
SPECIFIC GRAVITY: 1.01 (ref 1–1.03)
URINE-COLOR: YELLOW
UROBILINOGEN,SEMI-QN: 0.2 MG/DL (ref 0–1.9)

## 2018-09-05 PROCEDURE — 0502F SUBSEQUENT PRENATAL CARE: CPT | Performed by: OBSTETRICS & GYNECOLOGY

## 2018-09-05 PROCEDURE — 81002 URINALYSIS NONAUTO W/O SCOPE: CPT | Performed by: OBSTETRICS & GYNECOLOGY

## 2018-09-05 NOTE — TELEPHONE ENCOUNTER
OK.  I see the consent but I do not see any documentation of what kind of Tubal the patient desires. Does she want a Bilateral Salpingectomy or only a segment of each tube removed? Please confirm with patient.   If she is not sure then she should F/U with

## 2018-09-05 NOTE — TELEPHONE ENCOUNTER
Per [facility] schedule R/C -section @ 39 weeks. However, pt desires a tubal at time of  which she signed . Dr. Anthony Franco, you are on call this day.   Pt is scheduled  @ 7:30.    -assist pending

## 2018-09-05 NOTE — TELEPHONE ENCOUNTER
Pt agreed and signed consent for a bilateral salpingectomy. Called pt and informed her of date and time. She Understood and verbalized agreement. Pt has f/u PN appt with [facility] 9/13. Zamzam Kaplan greed to assist.   Spoke to Jose Carlos in [facility] and added him.

## 2018-09-07 ENCOUNTER — TELEPHONE (OUTPATIENT)
Dept: OBGYN CLINIC | Facility: CLINIC | Age: 33
End: 2018-09-07

## 2018-09-07 NOTE — TELEPHONE ENCOUNTER
Patient came to office stating she was charged $145.60 for some lab work on 6/13/18. Pt states she was informed that our office needs to send prior auth so her insurance will cover the labs she had done that day.  Patient left copy of bill with MIKE at front

## 2018-09-11 NOTE — TELEPHONE ENCOUNTER
RN in receipt of information. Call placed to insurer on patient behalf. Insurer requesting NPI of Dr. Pipe Olmedo (pathology) to release information about charges. RN placed call to pathology received. NPI of Dr. Pipe Olmedo.  RN to return call to insurer to contin

## 2018-09-13 ENCOUNTER — ANESTHESIA EVENT (OUTPATIENT)
Dept: OBGYN UNIT | Facility: HOSPITAL | Age: 33
End: 2018-09-13
Payer: MEDICAID

## 2018-09-13 ENCOUNTER — ROUTINE PRENATAL (OUTPATIENT)
Dept: OBGYN CLINIC | Facility: CLINIC | Age: 33
End: 2018-09-13
Payer: MEDICAID

## 2018-09-13 ENCOUNTER — HOSPITAL ENCOUNTER (INPATIENT)
Facility: HOSPITAL | Age: 33
LOS: 3 days | Discharge: HOME | End: 2018-09-16
Attending: OBSTETRICS & GYNECOLOGY | Admitting: OBSTETRICS & GYNECOLOGY
Payer: MEDICAID

## 2018-09-13 VITALS — BODY MASS INDEX: 42 KG/M2 | WEIGHT: 182 LBS | DIASTOLIC BLOOD PRESSURE: 70 MMHG | SYSTOLIC BLOOD PRESSURE: 110 MMHG

## 2018-09-13 DIAGNOSIS — Z34.83 ENCOUNTER FOR SUPERVISION OF OTHER NORMAL PREGNANCY IN THIRD TRIMESTER: Primary | ICD-10-CM

## 2018-09-13 PROBLEM — O24.419 GESTATIONAL DIABETES (HCC): Status: ACTIVE | Noted: 2018-09-13

## 2018-09-13 PROBLEM — Z98.891 PREVIOUS CESAREAN SECTION: Status: ACTIVE | Noted: 2018-09-13

## 2018-09-13 PROBLEM — O24.419 GESTATIONAL DIABETES: Status: ACTIVE | Noted: 2018-09-13

## 2018-09-13 PROBLEM — Z98.891 STATUS POST C-SECTION: Status: ACTIVE | Noted: 2018-09-13

## 2018-09-13 LAB
MULTISTIX LOT#: NORMAL NUMERIC
PH, URINE: 7 (ref 4.5–8)
SPECIFIC GRAVITY: 1.02 (ref 1–1.03)
URINE-COLOR: YELLOW
UROBILINOGEN,SEMI-QN: 1 MG/DL (ref 0–1.9)

## 2018-09-13 PROCEDURE — 0502F SUBSEQUENT PRENATAL CARE: CPT | Performed by: OBSTETRICS & GYNECOLOGY

## 2018-09-13 PROCEDURE — 59514 CESAREAN DELIVERY ONLY: CPT | Performed by: OBSTETRICS & GYNECOLOGY

## 2018-09-13 PROCEDURE — 0UB70ZZ EXCISION OF BILATERAL FALLOPIAN TUBES, OPEN APPROACH: ICD-10-PCS | Performed by: OBSTETRICS & GYNECOLOGY

## 2018-09-13 PROCEDURE — 81002 URINALYSIS NONAUTO W/O SCOPE: CPT | Performed by: OBSTETRICS & GYNECOLOGY

## 2018-09-13 PROCEDURE — 58611 LIGATE OVIDUCT(S) ADD-ON: CPT | Performed by: OBSTETRICS & GYNECOLOGY

## 2018-09-13 RX ORDER — DEXAMETHASONE SODIUM PHOSPHATE 4 MG/ML
VIAL (ML) INJECTION AS NEEDED
Status: DISCONTINUED | OUTPATIENT
Start: 2018-09-13 | End: 2018-09-13 | Stop reason: SURG

## 2018-09-13 RX ORDER — KETOROLAC TROMETHAMINE 30 MG/ML
30 INJECTION, SOLUTION INTRAMUSCULAR; INTRAVENOUS ONCE AS NEEDED
Status: DISCONTINUED | OUTPATIENT
Start: 2018-09-13 | End: 2018-09-13

## 2018-09-13 RX ORDER — ONDANSETRON 2 MG/ML
4 INJECTION INTRAMUSCULAR; INTRAVENOUS EVERY 6 HOURS PRN
Status: DISCONTINUED | OUTPATIENT
Start: 2018-09-13 | End: 2018-09-13

## 2018-09-13 RX ORDER — ACETAMINOPHEN 325 MG/1
650 TABLET ORAL EVERY 6 HOURS PRN
Status: ACTIVE | OUTPATIENT
Start: 2018-09-13 | End: 2018-09-14

## 2018-09-13 RX ORDER — CEFAZOLIN SODIUM/WATER 2 G/20 ML
2 SYRINGE (ML) INTRAVENOUS
Status: COMPLETED | OUTPATIENT
Start: 2018-09-13 | End: 2018-09-13

## 2018-09-13 RX ORDER — SODIUM CHLORIDE, SODIUM LACTATE, POTASSIUM CHLORIDE, CALCIUM CHLORIDE 600; 310; 30; 20 MG/100ML; MG/100ML; MG/100ML; MG/100ML
INJECTION, SOLUTION INTRAVENOUS CONTINUOUS
Status: DISCONTINUED | OUTPATIENT
Start: 2018-09-13 | End: 2018-09-13

## 2018-09-13 RX ORDER — ONDANSETRON 2 MG/ML
4 INJECTION INTRAMUSCULAR; INTRAVENOUS ONCE AS NEEDED
Status: DISCONTINUED | OUTPATIENT
Start: 2018-09-13 | End: 2018-09-13

## 2018-09-13 RX ORDER — NALBUPHINE HCL 10 MG/ML
2.5 AMPUL (ML) INJECTION
Status: DISCONTINUED | OUTPATIENT
Start: 2018-09-13 | End: 2018-09-13

## 2018-09-13 RX ORDER — DIPHENHYDRAMINE HCL 25 MG
25 CAPSULE ORAL EVERY 4 HOURS PRN
Status: ACTIVE | OUTPATIENT
Start: 2018-09-13 | End: 2018-09-14

## 2018-09-13 RX ORDER — PRENATAL VIT,CAL 76/IRON/FOLIC 29 MG-1 MG
1 TABLET ORAL DAILY
Status: DISCONTINUED | OUTPATIENT
Start: 2018-09-13 | End: 2018-09-16

## 2018-09-13 RX ORDER — HYDROCODONE BITARTRATE AND ACETAMINOPHEN 7.5; 325 MG/1; MG/1
1 TABLET ORAL EVERY 6 HOURS PRN
Status: ACTIVE | OUTPATIENT
Start: 2018-09-13 | End: 2018-09-14

## 2018-09-13 RX ORDER — NALBUPHINE HCL 10 MG/ML
2.5 AMPUL (ML) INJECTION EVERY 4 HOURS PRN
Status: ACTIVE | OUTPATIENT
Start: 2018-09-13 | End: 2018-09-14

## 2018-09-13 RX ORDER — SIMETHICONE 80 MG
80 TABLET,CHEWABLE ORAL 3 TIMES DAILY PRN
Status: DISCONTINUED | OUTPATIENT
Start: 2018-09-13 | End: 2018-09-16

## 2018-09-13 RX ORDER — HYDROMORPHONE HYDROCHLORIDE 1 MG/ML
0.4 INJECTION, SOLUTION INTRAMUSCULAR; INTRAVENOUS; SUBCUTANEOUS
Status: ACTIVE | OUTPATIENT
Start: 2018-09-13 | End: 2018-09-14

## 2018-09-13 RX ORDER — DEXTROSE, SODIUM CHLORIDE, SODIUM LACTATE, POTASSIUM CHLORIDE, AND CALCIUM CHLORIDE 5; .6; .31; .03; .02 G/100ML; G/100ML; G/100ML; G/100ML; G/100ML
INJECTION, SOLUTION INTRAVENOUS CONTINUOUS
Status: DISCONTINUED | OUTPATIENT
Start: 2018-09-14 | End: 2018-09-16

## 2018-09-13 RX ORDER — SODIUM CHLORIDE 0.9 % (FLUSH) 0.9 %
10 SYRINGE (ML) INJECTION AS NEEDED
Status: DISCONTINUED | OUTPATIENT
Start: 2018-09-13 | End: 2018-09-13

## 2018-09-13 RX ORDER — METOCLOPRAMIDE 10 MG/1
10 TABLET ORAL
Status: DISCONTINUED | OUTPATIENT
Start: 2018-09-13 | End: 2018-09-13

## 2018-09-13 RX ORDER — TRISODIUM CITRATE DIHYDRATE AND CITRIC ACID MONOHYDRATE 500; 334 MG/5ML; MG/5ML
30 SOLUTION ORAL ONCE
Status: DISCONTINUED | OUTPATIENT
Start: 2018-09-13 | End: 2018-09-13

## 2018-09-13 RX ORDER — KETOROLAC TROMETHAMINE 30 MG/ML
30 INJECTION, SOLUTION INTRAMUSCULAR; INTRAVENOUS EVERY 6 HOURS
Status: DISPENSED | OUTPATIENT
Start: 2018-09-13 | End: 2018-09-14

## 2018-09-13 RX ORDER — HYDROCODONE BITARTRATE AND ACETAMINOPHEN 5; 325 MG/1; MG/1
1 TABLET ORAL EVERY 6 HOURS PRN
Status: DISCONTINUED | OUTPATIENT
Start: 2018-09-13 | End: 2018-09-16

## 2018-09-13 RX ORDER — SODIUM CHLORIDE, SODIUM LACTATE, POTASSIUM CHLORIDE, CALCIUM CHLORIDE 600; 310; 30; 20 MG/100ML; MG/100ML; MG/100ML; MG/100ML
INJECTION, SOLUTION INTRAVENOUS
Status: DISPENSED
Start: 2018-09-13 | End: 2018-09-14

## 2018-09-13 RX ORDER — ONDANSETRON 2 MG/ML
INJECTION INTRAMUSCULAR; INTRAVENOUS AS NEEDED
Status: DISCONTINUED | OUTPATIENT
Start: 2018-09-13 | End: 2018-09-13 | Stop reason: SURG

## 2018-09-13 RX ORDER — SODIUM CHLORIDE 0.9 % (FLUSH) 0.9 %
10 SYRINGE (ML) INJECTION AS NEEDED
Status: DISCONTINUED | OUTPATIENT
Start: 2018-09-13 | End: 2018-09-16

## 2018-09-13 RX ORDER — HYDROCODONE BITARTRATE AND ACETAMINOPHEN 7.5; 325 MG/1; MG/1
2 TABLET ORAL EVERY 6 HOURS PRN
Status: ACTIVE | OUTPATIENT
Start: 2018-09-13 | End: 2018-09-14

## 2018-09-13 RX ORDER — DIPHENHYDRAMINE HYDROCHLORIDE 50 MG/ML
25 INJECTION INTRAMUSCULAR; INTRAVENOUS ONCE AS NEEDED
Status: DISCONTINUED | OUTPATIENT
Start: 2018-09-13 | End: 2018-09-13

## 2018-09-13 RX ORDER — IBUPROFEN 600 MG/1
600 TABLET ORAL EVERY 6 HOURS PRN
Status: DISCONTINUED | OUTPATIENT
Start: 2018-09-14 | End: 2018-09-16

## 2018-09-13 RX ORDER — BUPIVACAINE HYDROCHLORIDE 7.5 MG/ML
INJECTION, SOLUTION INTRASPINAL AS NEEDED
Status: DISCONTINUED | OUTPATIENT
Start: 2018-09-13 | End: 2018-09-13 | Stop reason: SURG

## 2018-09-13 RX ORDER — POLYETHYLENE GLYCOL 3350 17 G/17G
17 POWDER, FOR SOLUTION ORAL DAILY PRN
Status: DISCONTINUED | OUTPATIENT
Start: 2018-09-13 | End: 2018-09-16

## 2018-09-13 RX ORDER — MORPHINE SULFATE 1 MG/ML
INJECTION, SOLUTION EPIDURAL; INTRATHECAL; INTRAVENOUS AS NEEDED
Status: DISCONTINUED | OUTPATIENT
Start: 2018-09-13 | End: 2018-09-13 | Stop reason: SURG

## 2018-09-13 RX ORDER — SODIUM PHOSPHATE, DIBASIC AND SODIUM PHOSPHATE, MONOBASIC 7; 19 G/133ML; G/133ML
1 ENEMA RECTAL ONCE AS NEEDED
Status: DISCONTINUED | OUTPATIENT
Start: 2018-09-13 | End: 2018-09-16

## 2018-09-13 RX ORDER — DEXTROSE, SODIUM CHLORIDE, SODIUM LACTATE, POTASSIUM CHLORIDE, AND CALCIUM CHLORIDE 5; .6; .31; .03; .02 G/100ML; G/100ML; G/100ML; G/100ML; G/100ML
INJECTION, SOLUTION INTRAVENOUS
Status: DISPENSED
Start: 2018-09-13 | End: 2018-09-14

## 2018-09-13 RX ORDER — DIPHENHYDRAMINE HYDROCHLORIDE 50 MG/ML
12.5 INJECTION INTRAMUSCULAR; INTRAVENOUS EVERY 4 HOURS PRN
Status: ACTIVE | OUTPATIENT
Start: 2018-09-13 | End: 2018-09-14

## 2018-09-13 RX ORDER — HYDROMORPHONE HYDROCHLORIDE 1 MG/ML
0.6 INJECTION, SOLUTION INTRAMUSCULAR; INTRAVENOUS; SUBCUTANEOUS
Status: ACTIVE | OUTPATIENT
Start: 2018-09-13 | End: 2018-09-14

## 2018-09-13 RX ORDER — LIDOCAINE HYDROCHLORIDE 10 MG/ML
INJECTION, SOLUTION EPIDURAL; INFILTRATION; INTRACAUDAL; PERINEURAL AS NEEDED
Status: DISCONTINUED | OUTPATIENT
Start: 2018-09-13 | End: 2018-09-13 | Stop reason: SURG

## 2018-09-13 RX ORDER — ONDANSETRON 2 MG/ML
4 INJECTION INTRAMUSCULAR; INTRAVENOUS EVERY 6 HOURS PRN
Status: DISCONTINUED | OUTPATIENT
Start: 2018-09-13 | End: 2018-09-16

## 2018-09-13 RX ORDER — TRISODIUM CITRATE DIHYDRATE AND CITRIC ACID MONOHYDRATE 500; 334 MG/5ML; MG/5ML
30 SOLUTION ORAL ONCE
Status: COMPLETED | OUTPATIENT
Start: 2018-09-13 | End: 2018-09-13

## 2018-09-13 RX ORDER — NALOXONE HYDROCHLORIDE 0.4 MG/ML
0.08 INJECTION, SOLUTION INTRAMUSCULAR; INTRAVENOUS; SUBCUTANEOUS
Status: ACTIVE | OUTPATIENT
Start: 2018-09-13 | End: 2018-09-14

## 2018-09-13 RX ORDER — FAMOTIDINE 20 MG/1
20 TABLET ORAL 2 TIMES DAILY
Status: DISCONTINUED | OUTPATIENT
Start: 2018-09-13 | End: 2018-09-13

## 2018-09-13 RX ORDER — BISACODYL 10 MG
10 SUPPOSITORY, RECTAL RECTAL
Status: DISCONTINUED | OUTPATIENT
Start: 2018-09-13 | End: 2018-09-16

## 2018-09-13 RX ORDER — DOCUSATE SODIUM 100 MG/1
100 CAPSULE, LIQUID FILLED ORAL
Status: DISCONTINUED | OUTPATIENT
Start: 2018-09-13 | End: 2018-09-16

## 2018-09-13 RX ORDER — HALOPERIDOL 5 MG/ML
0.5 INJECTION INTRAMUSCULAR ONCE AS NEEDED
Status: ACTIVE | OUTPATIENT
Start: 2018-09-13 | End: 2018-09-13

## 2018-09-13 RX ORDER — AMMONIA INHALANTS 0.04 G/.3ML
0.3 INHALANT RESPIRATORY (INHALATION) AS NEEDED
Status: DISCONTINUED | OUTPATIENT
Start: 2018-09-13 | End: 2018-09-16

## 2018-09-13 RX ADMIN — SODIUM CHLORIDE, SODIUM LACTATE, POTASSIUM CHLORIDE, CALCIUM CHLORIDE: 600; 310; 30; 20 INJECTION, SOLUTION INTRAVENOUS at 18:22:00

## 2018-09-13 RX ADMIN — SODIUM CHLORIDE, SODIUM LACTATE, POTASSIUM CHLORIDE, CALCIUM CHLORIDE: 600; 310; 30; 20 INJECTION, SOLUTION INTRAVENOUS at 19:10:00

## 2018-09-13 RX ADMIN — SODIUM CHLORIDE, SODIUM LACTATE, POTASSIUM CHLORIDE, CALCIUM CHLORIDE: 600; 310; 30; 20 INJECTION, SOLUTION INTRAVENOUS at 18:21:00

## 2018-09-13 RX ADMIN — ONDANSETRON 4 MG: 2 INJECTION INTRAMUSCULAR; INTRAVENOUS at 18:29:00

## 2018-09-13 RX ADMIN — SODIUM CHLORIDE, SODIUM LACTATE, POTASSIUM CHLORIDE, CALCIUM CHLORIDE: 600; 310; 30; 20 INJECTION, SOLUTION INTRAVENOUS at 19:20:00

## 2018-09-13 RX ADMIN — SODIUM CHLORIDE, SODIUM LACTATE, POTASSIUM CHLORIDE, CALCIUM CHLORIDE: 600; 310; 30; 20 INJECTION, SOLUTION INTRAVENOUS at 17:51:00

## 2018-09-13 RX ADMIN — BUPIVACAINE HYDROCHLORIDE 1.5 ML: 7.5 INJECTION, SOLUTION INTRASPINAL at 17:59:00

## 2018-09-13 RX ADMIN — CEFAZOLIN SODIUM/WATER 2 G: 2 G/20 ML SYRINGE (ML) INTRAVENOUS at 18:04:00

## 2018-09-13 RX ADMIN — MORPHINE SULFATE 0.2 MG: 1 INJECTION, SOLUTION EPIDURAL; INTRATHECAL; INTRAVENOUS at 17:59:00

## 2018-09-13 RX ADMIN — LIDOCAINE HYDROCHLORIDE 3 ML: 10 INJECTION, SOLUTION EPIDURAL; INFILTRATION; INTRACAUDAL; PERINEURAL at 17:57:00

## 2018-09-13 RX ADMIN — DEXAMETHASONE SODIUM PHOSPHATE 4 MG: 4 MG/ML VIAL (ML) INJECTION at 18:29:00

## 2018-09-13 RX ADMIN — SODIUM CHLORIDE, SODIUM LACTATE, POTASSIUM CHLORIDE, CALCIUM CHLORIDE: 600; 310; 30; 20 INJECTION, SOLUTION INTRAVENOUS at 18:49:00

## 2018-09-13 NOTE — ANESTHESIA PREPROCEDURE EVALUATION
Anesthesia PreOp Note    HPI:     Rosenda Domínguez is a 28year old female who presents for preoperative consultation requested by: Divya Farley MD    Date of Surgery: 2018    Procedure(s):   SECTION  BILATERAL SALPINGECTOMY  Indicat MICROLET LANCETS Does not apply Misc 1 lancet by Finger stick route 4 (four) times daily. Use as directed.  Disp: 1 Box Rfl: 6 Taking   Blood Glucose Monitoring Suppl (ELAINA CONTOUR NEXT EZ) w/Device Does not apply Kit 1 Device by Other route 4 (four) times Transportation needs - non-medical: Not on file    Occupational History      Not on file    Tobacco Use      Smoking status: Never Smoker      Smokeless tobacco: Never Used    Substance and Sexual Activity      Alcohol use: Yes        Comment: osorio nye difficulty breathing, infection, bleeding, nerve damage, paralysis, death. The patient desires the proposed anesthetic as planned. With a .    Informed Consent Plan and Risks Discussed With:  Patient      I have informed Glorietta Peabody and/o

## 2018-09-13 NOTE — ANESTHESIA PROCEDURE NOTES
ANESTHESIA SPINAL BLOCK  Performed by: Manisha Burleson DO  Authorized by: Manisha Burleson DO     Patient Location:  OB  Anesthesiologist:  Manisha Burleson DO  Performed by:   Anesthesiologist  Preanesthetic Checklist: patient identified, IV checked, site [facility]

## 2018-09-13 NOTE — H&P
[facility] Patient Status:  Outpatient in a Bed    1985 MRN [id number]   Location [facility] Attending Lewis Irving MD   Hosp Day # 0 PCP Norma Garduno.  Danyelle Urine Culture No Growth at 18-24 hrs.   03/24/18 1028    Hepatitis B Nonreactive   Nonreactive 03/24/18 1129    HIV Combo Nonreactive   Nonreactive  03/24/18 1129          Optional Initial Labs     Test Value Reference Range Date Time    TSH 1.18 uIU/mL 0 Chlamydia DNA              Optional Labs     Test Value Reference Range Date Time    HgB A1c 5.1 % 4.0 - 6.0 % 04/20/18 1037    HGB Electrophoresis        Varicella Zoster        Cystic Fibrosis-Old         Cystic Fibrosis[32] (Required questions in OE to Medications:    Medications Prior to Admission:  Prenatal Vit-Fe Fumarate-FA (PREPLUS) 27-1 MG Oral Tab Take 1 tablet by mouth daily. Disp: 90 tablet Rfl: 6 Taking   HUMULIN N 100 UNIT/ML Subcutaneous Suspension Inject 3 Units into the skin nightly.  Disp: Position: Cephalic  Ultrasound confirmed vertex presentation    Results:     Lab Results   Component Value Date    TREPONEMALAB Negative 03/24/2018    RAPIDHIVSCRN Nonreactive 09/13/2018    HIV Nonreactive  03/24/2018    HBVSAG Nonreactive  03/24/2018    A Routine labs and monitoring were ordered. Neonatology and anesthesia are aware.   Admission problem(s):    Gestational diabetes      Previous  section        Ubaldo David MD  2018  4:32 PM

## 2018-09-14 NOTE — PROGRESS NOTES
Dr. Sebastian Berumen notified of patient having small clots and small gushes with each fundal check. EBL was 210 in recovery. Orders for 1 bag of pitocin.

## 2018-09-14 NOTE — PROGRESS NOTES
[facility] HOSP - [facility]    Post-Partum Caesarean Section Progress Note    Britta Meza Patient Status:  Inpatient    1985 MRN [id number]   Location [facility] 3SE Attending Rico Menezes MD   Hosp Day # 1 PCP Davida Edouard.  Cesped ASSESSMENT/PLAN:  A: 28 y  status post repeat low transverse  section and bilateral salpingectomy, POD #1. Doing well postoperatively.    Plan to have patient up in chair and ambulate  Reyes to be removed today  Will advance diet as binh

## 2018-09-14 NOTE — OPERATIVE REPORT
[facility]    PATIENT'S NAME: Libra Zabala   ATTENDING PHYSICIAN: Mau Hill MD   OPERATING PHYSICIAN: Mau Hill MD   PATIENT ACCOUNT#:   [id number]    LOCATION:  [address] [place] [address]  MEDICAL RECORD #:   [id number]       DATE [facility] with Kocher clamps, elevated, and underlying rectus muscles dissected off bluntly with the midline being dissected off sharply. Attention was then turned to the inferior aspect of the fascial incision.   It was grasped with Kocher clamps, elevated, and und time, the uterine incision was then reevaluated and some coagulation with cautery was used. There was still some minimal oozing and so Helen hemostatic powder was used.   After hemostasis was noted, the peritoneum was closed with 2-0 Vicryl in a running f

## 2018-09-14 NOTE — ANESTHESIA POSTPROCEDURE EVALUATION
Patient: Aida Pain    Procedure Summary     Date:  18 Room / Location:  [facility] L+D OR 01 / [facility] L+D OR    Anesthesia Start:   Anesthesia Stop:      Procedures:        SECTION (Bilateral Abdomen)      BILATERAL SALPINGECTOMY (Bi

## 2018-09-14 NOTE — PROGRESS NOTES
Bedside report given to [facility], RN. Patients VSS, denies pain, small bleeding noted with no clots at this time. D5LR and pitocin infusing at this time. Patient able to move legs and lift buttock off bed. Patient in SCN room 4 with  at this time.

## 2018-09-14 NOTE — CONSULTS
pod1 csection with duramorph spinal pt doing well good post op pain relief   No headache no backache    cpm

## 2018-09-14 NOTE — BRIEF OP NOTE
[facility] HOSP - [facility]     Section Delivery Note    Rosenda Reveal Patient Status:  Outpatient in a Bed    1985 MRN [id number]   Location [address]  Attending Trav Cleaning MD   Hosp Day # 0 PCP Da

## 2018-09-14 NOTE — PLAN OF CARE
Received patient into room 362 . Bedside shift report received from [facility]. Patient transferred to bed from bed. Bed in locked and low position. Side rails up X2. Vital signs stable, fundus firm , lochia small, no clots noted. IV site unremarkable.

## 2018-09-15 NOTE — PLAN OF CARE
GASTROINTESTINAL - ADULT    • Minimal or absence of nausea and vomiting Progressing        POSTPARTUM    • Long Term Goal:Experiences normal postpartum course Progressing    • Appropriate maternal -  bonding Progressing

## 2018-09-15 NOTE — PROGRESS NOTES
[facility] - [facility]    OB/GYNE Progress Note      Андрей Green Patient Status:  Inpatient    1985 MRN [id number]   Location [facility] 3SE Attending Hudson Henley MD   Hosp Day # 2 PCP Stefany Horvath.  DO Janice        Assess Results   Component Value Date    COLORUR Yellow 03/24/2018    CLARITY Hazy (A) 03/24/2018    SPECGRAVITY 1.020 09/13/2018    PROUR Negative 03/24/2018    GLUUR neg 09/13/2018    KETUR Negative 03/24/2018    BILUR Negative 03/24/2018    BLOODURINE Negative

## 2018-09-15 NOTE — PLAN OF CARE
ANXIETY    • Will report anxiety at manageable levels Progressing        Diabetes/Glucose Control    • Glucose maintained within prescribed range Progressing        GASTROINTESTINAL - ADULT    • Minimal or absence of nausea and vomiting Progressing    • Ma

## 2018-09-16 VITALS
HEART RATE: 81 BPM | DIASTOLIC BLOOD PRESSURE: 62 MMHG | TEMPERATURE: 98 F | WEIGHT: 182 LBS | BODY MASS INDEX: 40.94 KG/M2 | SYSTOLIC BLOOD PRESSURE: 105 MMHG | OXYGEN SATURATION: 99 % | RESPIRATION RATE: 16 BRPM | HEIGHT: 56 IN

## 2018-09-16 PROBLEM — D69.6 BENIGN GESTATIONAL THROMBOCYTOPENIA IN THIRD TRIMESTER (HCC): Status: ACTIVE | Noted: 2018-09-16

## 2018-09-16 PROBLEM — O99.113 BENIGN GESTATIONAL THROMBOCYTOPENIA IN THIRD TRIMESTER (HCC): Status: ACTIVE | Noted: 2018-09-16

## 2018-09-16 RX ORDER — IBUPROFEN 600 MG/1
600 TABLET ORAL EVERY 6 HOURS PRN
Qty: 15 TABLET | Refills: 0 | Status: SHIPPED | OUTPATIENT
Start: 2018-09-16

## 2018-09-16 RX ORDER — PSEUDOEPHEDRINE HCL 30 MG
100 TABLET ORAL 2 TIMES DAILY
Qty: 30 CAPSULE | Refills: 0 | Status: SHIPPED | OUTPATIENT
Start: 2018-09-16 | End: 2018-10-25

## 2018-09-16 RX ORDER — HYDROCODONE BITARTRATE AND ACETAMINOPHEN 5; 325 MG/1; MG/1
1 TABLET ORAL EVERY 6 HOURS PRN
Qty: 12 TABLET | Refills: 0 | Status: SHIPPED | OUTPATIENT
Start: 2018-09-16 | End: 2018-10-25

## 2018-09-16 NOTE — PLAN OF CARE
ANXIETY    • Will report anxiety at manageable levels Completed        Diabetes/Glucose Control    • Glucose maintained within prescribed range Completed        GASTROINTESTINAL - ADULT    • Minimal or absence of nausea and vomiting Completed    • Maintain

## 2018-09-16 NOTE — DISCHARGE SUMMARY
[facility]    OB/GYNE Discharge Note      [facility] Patient Status:  Inpatient    1985 MRN [id number]   Location [facility] 3SE Attending Thony Meier MD   Hosp Day # 3 PCP Durga Cr.  Nathan Friend, [address]

## 2018-09-17 ENCOUNTER — TELEPHONE (OUTPATIENT)
Dept: OBGYN CLINIC | Facility: CLINIC | Age: 33
End: 2018-09-17

## 2018-09-19 ENCOUNTER — TELEPHONE (OUTPATIENT)
Dept: OBGYN CLINIC | Facility: CLINIC | Age: 33
End: 2018-09-19

## 2018-09-19 ENCOUNTER — NURSE ONLY (OUTPATIENT)
Dept: OBGYN CLINIC | Facility: CLINIC | Age: 33
End: 2018-09-19
Payer: MEDICAID

## 2018-09-19 VITALS — SYSTOLIC BLOOD PRESSURE: 110 MMHG | HEART RATE: 78 BPM | TEMPERATURE: 99 F | DIASTOLIC BLOOD PRESSURE: 72 MMHG

## 2018-09-19 DIAGNOSIS — Z98.890 POST-OPERATIVE STATE: Primary | ICD-10-CM

## 2018-09-19 PROCEDURE — 99024 POSTOP FOLLOW-UP VISIT: CPT | Performed by: OBSTETRICS & GYNECOLOGY

## 2018-09-19 NOTE — PROGRESS NOTES
Pt with hx of secondary  on  and A2 gestational diabetes mellitus here for incision check. Initial line well approximated, healing well, no signs of infection noted.  Pt advised to follow up with Dr. Estelita Andrews 2 weeks post op, verbalized Heike

## 2018-09-19 NOTE — TELEPHONE ENCOUNTER
Pt post  came today for incision check and provided a letter from the [facility] that her hemoglobin test for today  was 9.3 and that she should inform and f/u with our office.  Pt stated she is currently taking p

## 2018-09-20 ENCOUNTER — ANESTHESIA (OUTPATIENT)
Dept: OBGYN UNIT | Facility: HOSPITAL | Age: 33
End: 2018-09-20
Payer: MEDICAID

## 2018-09-21 NOTE — TELEPHONE ENCOUNTER
RN placed call to [address] [place] [address] on patient behalf to follow up on bill. Per rep, this claim was paid for the full allowable amount and patient can not be billed as she is a medicaid patient.  Per rep, if our billing office is choosing to dispute

## 2018-09-24 NOTE — TELEPHONE ENCOUNTER
Per Meliza Webster, pt should be advised to submit proof of Medicaid coverage to [address] [place] [address]. So that they can update their records and NOT charge patient. RN to notify.

## 2018-09-25 NOTE — TELEPHONE ENCOUNTER
RN placed call to patient via the language line,  ID# [id number]    RN returns call to patient. Pt advised of information noted below, per patient she did attempt to advise  but was directed to follow up with providers office.   RN to place ca

## 2018-09-27 ENCOUNTER — OFFICE VISIT (OUTPATIENT)
Dept: OBGYN CLINIC | Facility: CLINIC | Age: 33
End: 2018-09-27
Payer: MEDICAID

## 2018-09-27 VITALS — SYSTOLIC BLOOD PRESSURE: 113 MMHG | DIASTOLIC BLOOD PRESSURE: 74 MMHG | BODY MASS INDEX: 35 KG/M2 | WEIGHT: 158 LBS

## 2018-09-27 DIAGNOSIS — Z98.890 POST-OPERATIVE STATE: Primary | ICD-10-CM

## 2018-09-27 PROBLEM — O36.8230: Status: RESOLVED | Noted: 2018-08-06 | Resolved: 2018-09-27

## 2018-09-27 PROCEDURE — 99024 POSTOP FOLLOW-UP VISIT: CPT | Performed by: OBSTETRICS & GYNECOLOGY

## 2018-09-27 NOTE — PROGRESS NOTES
TAM    Rom Oseguera is a 28year old female  here for post C/S wound check. Patient delivered a  male infant on 2018 via repeat CSx.     Pt states she is voiding freely, tolerating general diet, having normal bowel movements and minimal completely. Discussed current restrictions and return to activity slowly. Pt to f/u at 6 weeks post C/S for PP visit and discussion of contraception. We will plan on a 75 g Glucola being ordered at that time as well.     Radha Mendez MD  9/27/2018

## 2018-10-22 NOTE — TELEPHONE ENCOUNTER
Called [facility] [phone] and received automated message that call was not ot available from our calling area, then call was disconnected.       lmtcb for Canadian speaking patient to inform    [phone]

## 2018-10-24 NOTE — TELEPHONE ENCOUNTER
Spoke to David Rodrigues from [address] [place] [address] which the number  [phone] was the correct one. David Rodrigues was provided with information regarding the service date of 6/13 and the bill the patient is receiving.  Was also informed that pt has m

## 2018-10-25 ENCOUNTER — POSTPARTUM (OUTPATIENT)
Dept: OBGYN CLINIC | Facility: CLINIC | Age: 33
End: 2018-10-25
Payer: MEDICAID

## 2018-10-25 VITALS
WEIGHT: 158 LBS | HEART RATE: 60 BPM | BODY MASS INDEX: 35 KG/M2 | SYSTOLIC BLOOD PRESSURE: 106 MMHG | DIASTOLIC BLOOD PRESSURE: 73 MMHG

## 2018-10-25 DIAGNOSIS — Z86.32 HISTORY OF GESTATIONAL DIABETES: ICD-10-CM

## 2018-10-25 PROBLEM — O24.419 GESTATIONAL DIABETES (HCC): Status: RESOLVED | Noted: 2018-09-13 | Resolved: 2018-10-25

## 2018-10-25 PROBLEM — O24.419 GESTATIONAL DIABETES: Status: RESOLVED | Noted: 2018-09-13 | Resolved: 2018-10-25

## 2018-10-25 PROBLEM — O24.414 INSULIN CONTROLLED WHITE CLASSIFICATION A2 GESTATIONAL DIABETES MELLITUS (GDM) (HCC): Status: RESOLVED | Noted: 2018-04-24 | Resolved: 2018-10-25

## 2018-10-25 PROBLEM — O99.113 BENIGN GESTATIONAL THROMBOCYTOPENIA IN THIRD TRIMESTER (HCC): Status: RESOLVED | Noted: 2018-09-16 | Resolved: 2018-10-25

## 2018-10-25 PROBLEM — O34.219 PREVIOUS CESAREAN DELIVERY AFFECTING PREGNANCY: Status: RESOLVED | Noted: 2018-04-14 | Resolved: 2018-10-25

## 2018-10-25 PROBLEM — Z98.891 STATUS POST C-SECTION: Status: RESOLVED | Noted: 2018-09-13 | Resolved: 2018-10-25

## 2018-10-25 PROBLEM — O34.219 PREVIOUS CESAREAN DELIVERY AFFECTING PREGNANCY (HCC): Status: RESOLVED | Noted: 2018-04-14 | Resolved: 2018-10-25

## 2018-10-25 PROBLEM — Z30.2 REQUEST FOR STERILIZATION: Status: RESOLVED | Noted: 2018-04-14 | Resolved: 2018-10-25

## 2018-10-25 PROBLEM — D69.6 BENIGN GESTATIONAL THROMBOCYTOPENIA IN THIRD TRIMESTER (HCC): Status: RESOLVED | Noted: 2018-09-16 | Resolved: 2018-10-25

## 2018-10-25 PROBLEM — O99.210 OBESITY AFFECTING PREGNANCY, ANTEPARTUM (HCC): Status: RESOLVED | Noted: 2018-04-14 | Resolved: 2018-10-25

## 2018-10-25 PROBLEM — O99.210 OBESITY AFFECTING PREGNANCY, ANTEPARTUM: Status: RESOLVED | Noted: 2018-04-14 | Resolved: 2018-10-25

## 2018-10-25 PROBLEM — O24.414 INSULIN CONTROLLED WHITE CLASSIFICATION A2 GESTATIONAL DIABETES MELLITUS (GDM): Status: RESOLVED | Noted: 2018-04-24 | Resolved: 2018-10-25

## 2018-10-25 PROCEDURE — 0503F POSTPARTUM CARE VISIT: CPT | Performed by: OBSTETRICS & GYNECOLOGY

## 2018-10-25 NOTE — TELEPHONE ENCOUNTER
Per [facility] from [facility], pt does not need to pay anything and they have adjusted their records on their end. Called pt to inform and verbalized understanding.

## 2018-10-25 NOTE — PROGRESS NOTES
TAM    Dakota Aleman is a 28year old female  here for 6 week post-partum visit. Patient delivered a  male infant on 2018 via repeat CSx.     Pt states she is voiding freely, tolerating general diet, having normal bowel movements and cur •  Prenatal Vit-Fe Fumarate-FA (PRENAPLUS) 27-1 MG Oral Tab, Take by mouth., Disp: , Rfl:   •  ibuprofen 600 MG Oral Tab, Take 1 tablet (600 mg total) by mouth every 6 (six) hours as needed. , Disp: 15 tablet, Rfl: 0  •  Ferrous Sulfate 325 (65 Fe) MG Ora

## 2018-11-24 ENCOUNTER — LAB ENCOUNTER (OUTPATIENT)
Dept: LAB | Age: 33
End: 2018-11-24
Attending: OBSTETRICS & GYNECOLOGY
Payer: MEDICAID

## 2018-11-24 DIAGNOSIS — Z86.32 HISTORY OF GESTATIONAL DIABETES: ICD-10-CM

## 2018-11-24 PROCEDURE — 36415 COLL VENOUS BLD VENIPUNCTURE: CPT

## 2018-11-24 PROCEDURE — 82951 GLUCOSE TOLERANCE TEST (GTT): CPT

## 2018-11-26 ENCOUNTER — TELEPHONE (OUTPATIENT)
Dept: OBGYN CLINIC | Facility: CLINIC | Age: 33
End: 2018-11-26

## 2018-11-26 NOTE — TELEPHONE ENCOUNTER
Spoke with pt and advised of normal 2hr gtt per Dr. Jasbir Minaya. Pt agreed and voiced understanding.

## 2018-11-26 NOTE — TELEPHONE ENCOUNTER
----- Message from May Garrison MD sent at 11/25/2018  9:59 AM CST -----  Result noted.   Please notify patient that her 2-hour 75 g Glucola was normal.

## 2019-10-19 ENCOUNTER — LAB ENCOUNTER (OUTPATIENT)
Dept: LAB | Age: 34
End: 2019-10-19
Attending: NURSE PRACTITIONER
Payer: COMMERCIAL

## 2019-10-19 DIAGNOSIS — G43.909 MIGRAINE: ICD-10-CM

## 2019-10-19 DIAGNOSIS — Z86.2 HISTORY OF ANEMIA: Primary | ICD-10-CM

## 2019-10-19 DIAGNOSIS — Z86.32 HISTORY OF GESTATIONAL DIABETES: ICD-10-CM

## 2019-10-19 PROCEDURE — 84443 ASSAY THYROID STIM HORMONE: CPT

## 2019-10-19 PROCEDURE — 83036 HEMOGLOBIN GLYCOSYLATED A1C: CPT

## 2019-10-19 PROCEDURE — 80053 COMPREHEN METABOLIC PANEL: CPT

## 2019-10-19 PROCEDURE — 36415 COLL VENOUS BLD VENIPUNCTURE: CPT

## 2019-10-19 PROCEDURE — 85025 COMPLETE CBC W/AUTO DIFF WBC: CPT

## 2021-05-01 ENCOUNTER — LAB ENCOUNTER (OUTPATIENT)
Dept: LAB | Age: 36
End: 2021-05-01
Attending: NURSE PRACTITIONER
Payer: COMMERCIAL

## 2021-05-01 DIAGNOSIS — R51.9 HA (HEADACHE): Primary | ICD-10-CM

## 2021-05-01 DIAGNOSIS — E66.9 OBESITY, UNSPECIFIED: ICD-10-CM

## 2021-05-01 DIAGNOSIS — Z13.29 THYROID DISORDER SCREEN: ICD-10-CM

## 2021-05-01 PROCEDURE — 85025 COMPLETE CBC W/AUTO DIFF WBC: CPT

## 2021-05-01 PROCEDURE — 36415 COLL VENOUS BLD VENIPUNCTURE: CPT

## 2021-05-01 PROCEDURE — 80053 COMPREHEN METABOLIC PANEL: CPT

## 2021-05-01 PROCEDURE — 84443 ASSAY THYROID STIM HORMONE: CPT

## 2021-05-01 PROCEDURE — 80061 LIPID PANEL: CPT

## 2023-11-16 ENCOUNTER — LAB ENCOUNTER (OUTPATIENT)
Dept: LAB | Age: 38
End: 2023-11-16
Payer: COMMERCIAL

## 2023-11-16 DIAGNOSIS — Z00.00 ROUTINE GENERAL MEDICAL EXAMINATION AT A HEALTH CARE FACILITY: Primary | ICD-10-CM

## 2023-11-16 LAB
ALBUMIN SERPL-MCNC: 4.4 G/DL (ref 3.2–4.8)
ALBUMIN/GLOB SERPL: 1.4 {RATIO} (ref 1–2)
ALP LIVER SERPL-CCNC: 78 U/L
ALT SERPL-CCNC: 51 U/L
ANION GAP SERPL CALC-SCNC: 8 MMOL/L (ref 0–18)
AST SERPL-CCNC: 35 U/L (ref ?–34)
BASOPHILS # BLD AUTO: 0.05 X10(3) UL (ref 0–0.2)
BASOPHILS NFR BLD AUTO: 0.7 %
BILIRUB SERPL-MCNC: 0.9 MG/DL (ref 0.3–1.2)
BUN BLD-MCNC: 9 MG/DL (ref 9–23)
BUN/CREAT SERPL: 15 (ref 10–20)
CALCIUM BLD-MCNC: 9.1 MG/DL (ref 8.7–10.4)
CHLORIDE SERPL-SCNC: 103 MMOL/L (ref 98–112)
CHOLEST SERPL-MCNC: 186 MG/DL (ref ?–200)
CO2 SERPL-SCNC: 26 MMOL/L (ref 21–32)
CREAT BLD-MCNC: 0.6 MG/DL
DEPRECATED RDW RBC AUTO: 41.6 FL (ref 35.1–46.3)
EGFRCR SERPLBLD CKD-EPI 2021: 118 ML/MIN/1.73M2 (ref 60–?)
EOSINOPHIL # BLD AUTO: 0.12 X10(3) UL (ref 0–0.7)
EOSINOPHIL NFR BLD AUTO: 1.7 %
ERYTHROCYTE [DISTWIDTH] IN BLOOD BY AUTOMATED COUNT: 14.8 % (ref 11–15)
EST. AVERAGE GLUCOSE BLD GHB EST-MCNC: 128 MG/DL (ref 68–126)
FASTING PATIENT LIPID ANSWER: YES
FASTING STATUS PATIENT QL REPORTED: YES
GLOBULIN PLAS-MCNC: 3.1 G/DL (ref 2.8–4.4)
GLUCOSE BLD-MCNC: 95 MG/DL (ref 70–99)
HBA1C MFR BLD: 6.1 % (ref ?–5.7)
HCT VFR BLD AUTO: 36.6 %
HDLC SERPL-MCNC: 55 MG/DL (ref 40–59)
HGB BLD-MCNC: 11.6 G/DL
IMM GRANULOCYTES # BLD AUTO: 0.01 X10(3) UL (ref 0–1)
IMM GRANULOCYTES NFR BLD: 0.1 %
LDLC SERPL CALC-MCNC: 113 MG/DL (ref ?–100)
LYMPHOCYTES # BLD AUTO: 1.87 X10(3) UL (ref 1–4)
LYMPHOCYTES NFR BLD AUTO: 27.1 %
MCH RBC QN AUTO: 24.8 PG (ref 26–34)
MCHC RBC AUTO-ENTMCNC: 31.7 G/DL (ref 31–37)
MCV RBC AUTO: 78.2 FL
MONOCYTES # BLD AUTO: 0.49 X10(3) UL (ref 0.1–1)
MONOCYTES NFR BLD AUTO: 7.1 %
NEUTROPHILS # BLD AUTO: 4.35 X10 (3) UL (ref 1.5–7.7)
NEUTROPHILS # BLD AUTO: 4.35 X10(3) UL (ref 1.5–7.7)
NEUTROPHILS NFR BLD AUTO: 63.3 %
NONHDLC SERPL-MCNC: 131 MG/DL (ref ?–130)
OSMOLALITY SERPL CALC.SUM OF ELEC: 282 MOSM/KG (ref 275–295)
PLATELET # BLD AUTO: 195 10(3)UL (ref 150–450)
POTASSIUM SERPL-SCNC: 3.8 MMOL/L (ref 3.5–5.1)
PROT SERPL-MCNC: 7.5 G/DL (ref 5.7–8.2)
RBC # BLD AUTO: 4.68 X10(6)UL
SODIUM SERPL-SCNC: 137 MMOL/L (ref 136–145)
TRIGL SERPL-MCNC: 99 MG/DL (ref 30–149)
TSI SER-ACNC: 1.88 MIU/ML (ref 0.55–4.78)
VLDLC SERPL CALC-MCNC: 17 MG/DL (ref 0–30)
WBC # BLD AUTO: 6.9 X10(3) UL (ref 4–11)

## 2023-11-16 PROCEDURE — 80061 LIPID PANEL: CPT

## 2023-11-16 PROCEDURE — 84443 ASSAY THYROID STIM HORMONE: CPT

## 2023-11-16 PROCEDURE — 82306 VITAMIN D 25 HYDROXY: CPT

## 2023-11-16 PROCEDURE — 85025 COMPLETE CBC W/AUTO DIFF WBC: CPT

## 2023-11-16 PROCEDURE — 83036 HEMOGLOBIN GLYCOSYLATED A1C: CPT

## 2023-11-16 PROCEDURE — 80053 COMPREHEN METABOLIC PANEL: CPT

## 2023-11-16 PROCEDURE — 36415 COLL VENOUS BLD VENIPUNCTURE: CPT

## 2023-11-16 PROCEDURE — 87389 HIV-1 AG W/HIV-1&-2 AB AG IA: CPT

## 2023-11-16 PROCEDURE — 80074 ACUTE HEPATITIS PANEL: CPT

## 2023-11-17 LAB
HAV IGM SER QL: NONREACTIVE
HBV CORE IGM SER QL: NONREACTIVE
HBV SURFACE AG SERPL QL IA: NONREACTIVE
HCV AB SERPL QL IA: NONREACTIVE
VIT D+METAB SERPL-MCNC: 12.1 NG/ML (ref 30–100)

## 2023-11-22 ENCOUNTER — HOSPITAL ENCOUNTER (OUTPATIENT)
Age: 38
Discharge: HOME | End: 2023-11-22
Payer: COMMERCIAL

## 2023-11-22 ENCOUNTER — LAB ENCOUNTER (OUTPATIENT)
Dept: LAB | Age: 38
End: 2023-11-22
Payer: COMMERCIAL

## 2023-11-22 DIAGNOSIS — Z11.3 SCREENING EXAMINATION FOR VENEREAL DISEASE: Primary | ICD-10-CM

## 2023-11-22 DIAGNOSIS — D64.9 ANEMIA, UNSPECIFIED: ICD-10-CM

## 2023-11-22 LAB
ALBUMIN SERPL-MCNC: 4 G/DL (ref 3.2–4.8)
ALBUMIN/GLOB SERPL: 1.4 {RATIO} (ref 1–2)
ALP LIVER SERPL-CCNC: 74 U/L
ALT SERPL-CCNC: 32 U/L
ANION GAP SERPL CALC-SCNC: 8 MMOL/L (ref 0–18)
AST SERPL-CCNC: 20 U/L (ref ?–34)
BASOPHILS # BLD AUTO: 0.04 X10(3) UL (ref 0–0.2)
BASOPHILS NFR BLD AUTO: 0.5 %
BILIRUB SERPL-MCNC: 0.5 MG/DL (ref 0.3–1.2)
BUN BLD-MCNC: 7 MG/DL (ref 9–23)
BUN/CREAT SERPL: 10.6 (ref 10–20)
CALCIUM BLD-MCNC: 9 MG/DL (ref 8.7–10.4)
CHLORIDE SERPL-SCNC: 105 MMOL/L (ref 98–112)
CO2 SERPL-SCNC: 25 MMOL/L (ref 21–32)
CREAT BLD-MCNC: 0.66 MG/DL
DEPRECATED HBV CORE AB SER IA-ACNC: 16 NG/ML
DEPRECATED RDW RBC AUTO: 42.5 FL (ref 35.1–46.3)
EGFRCR SERPLBLD CKD-EPI 2021: 115 ML/MIN/1.73M2 (ref 60–?)
EOSINOPHIL # BLD AUTO: 0.16 X10(3) UL (ref 0–0.7)
EOSINOPHIL NFR BLD AUTO: 2.1 %
ERYTHROCYTE [DISTWIDTH] IN BLOOD BY AUTOMATED COUNT: 14.9 % (ref 11–15)
FASTING STATUS PATIENT QL REPORTED: YES
GLOBULIN PLAS-MCNC: 2.9 G/DL (ref 2.8–4.4)
GLUCOSE BLD-MCNC: 137 MG/DL (ref 70–99)
HAV IGM SER QL: NONREACTIVE
HBV CORE IGM SER QL: NONREACTIVE
HBV SURFACE AG SERPL QL IA: NONREACTIVE
HCT VFR BLD AUTO: 34.3 %
HCV AB SERPL QL IA: NONREACTIVE
HGB BLD-MCNC: 10.9 G/DL
IMM GRANULOCYTES # BLD AUTO: 0.03 X10(3) UL (ref 0–1)
IMM GRANULOCYTES NFR BLD: 0.4 %
IRON SATN MFR SERPL: 10 %
IRON SERPL-MCNC: 40 UG/DL
LYMPHOCYTES # BLD AUTO: 1.87 X10(3) UL (ref 1–4)
LYMPHOCYTES NFR BLD AUTO: 24.8 %
MCH RBC QN AUTO: 24.9 PG (ref 26–34)
MCHC RBC AUTO-ENTMCNC: 31.8 G/DL (ref 31–37)
MCV RBC AUTO: 78.3 FL
MONOCYTES # BLD AUTO: 0.51 X10(3) UL (ref 0.1–1)
MONOCYTES NFR BLD AUTO: 6.8 %
NEUTROPHILS # BLD AUTO: 4.92 X10 (3) UL (ref 1.5–7.7)
NEUTROPHILS # BLD AUTO: 4.92 X10(3) UL (ref 1.5–7.7)
NEUTROPHILS NFR BLD AUTO: 65.4 %
OSMOLALITY SERPL CALC.SUM OF ELEC: 286 MOSM/KG (ref 275–295)
PLATELET # BLD AUTO: 175 10(3)UL (ref 150–450)
POTASSIUM SERPL-SCNC: 3.8 MMOL/L (ref 3.5–5.1)
PROT SERPL-MCNC: 6.9 G/DL (ref 5.7–8.2)
RBC # BLD AUTO: 4.38 X10(6)UL
SODIUM SERPL-SCNC: 138 MMOL/L (ref 136–145)
TIBC SERPL-MCNC: 411 UG/DL (ref 250–425)
TRANSFERRIN SERPL-MCNC: 276 MG/DL (ref 250–380)
WBC # BLD AUTO: 7.5 X10(3) UL (ref 4–11)

## 2023-11-22 PROCEDURE — 80053 COMPREHEN METABOLIC PANEL: CPT

## 2023-11-22 PROCEDURE — 86780 TREPONEMA PALLIDUM: CPT

## 2023-11-22 PROCEDURE — 82728 ASSAY OF FERRITIN: CPT

## 2023-11-22 PROCEDURE — 87591 N.GONORRHOEAE DNA AMP PROB: CPT

## 2023-11-22 PROCEDURE — 87661 TRICHOMONAS VAGINALIS AMPLIF: CPT

## 2023-11-22 PROCEDURE — 83540 ASSAY OF IRON: CPT

## 2023-11-22 PROCEDURE — 85025 COMPLETE CBC W/AUTO DIFF WBC: CPT

## 2023-11-22 PROCEDURE — 87389 HIV-1 AG W/HIV-1&-2 AB AG IA: CPT

## 2023-11-22 PROCEDURE — 84466 ASSAY OF TRANSFERRIN: CPT

## 2023-11-22 PROCEDURE — 80074 ACUTE HEPATITIS PANEL: CPT

## 2023-11-22 PROCEDURE — 87491 CHLMYD TRACH DNA AMP PROBE: CPT

## 2023-11-22 PROCEDURE — 36415 COLL VENOUS BLD VENIPUNCTURE: CPT

## 2023-11-24 LAB
C TRACH DNA SPEC QL NAA+PROBE: NEGATIVE
N GONORRHOEA DNA SPEC QL NAA+PROBE: NEGATIVE
T PALLIDUM AB SER QL: NEGATIVE

## 2023-11-27 LAB — TRICH VAG NAA: NEGATIVE

## 2023-11-27 NOTE — PROGRESS NOTES
Patient has not been going for NSts due to A2 DM. Also has not gone for repeat CBC due to Thrombocytopenia. Will send to [place] Triage now for NST, CBC and Accucheck.   Patient is scheduled for repeat  on  Thursday, 2018 @ 7:30 AM.  Patient inst
Small leukocytes urine dip
DISPLAY PLAN FREE TEXT

## 2024-05-16 ENCOUNTER — HOSPITAL ENCOUNTER (OUTPATIENT)
Dept: ULTRASOUND IMAGING | Age: 39
Discharge: HOME | End: 2024-05-16
Attending: NURSE PRACTITIONER

## 2024-05-16 DIAGNOSIS — N92.1 MENORRHAGIA WITH IRREGULAR CYCLE: ICD-10-CM

## 2024-05-16 PROCEDURE — 76856 US EXAM PELVIC COMPLETE: CPT | Performed by: NURSE PRACTITIONER

## 2024-05-16 PROCEDURE — 76830 TRANSVAGINAL US NON-OB: CPT | Performed by: NURSE PRACTITIONER

## (undated) DEVICE — TRAY CATH BDX IC 14FR 2L FL

## (undated) DEVICE — ABDOMINAL PAD: Brand: CURITY

## (undated) DEVICE — REM POLYHESIVE ADULT PATIENT RETURN ELECTRODE: Brand: VALLEYLAB

## (undated) DEVICE — VIOLET BRAIDED (POLYGLACTIN 910), SYNTHETIC ABSORBABLE SUTURE: Brand: COATED VICRYL

## (undated) DEVICE — STERILE LATEX POWDER-FREE SURGICAL GLOVESWITH NITRILE COATING: Brand: PROTEXIS

## (undated) DEVICE — KENDALL SCD EXPRESS SLEEVES, KNEE LENGTH, MEDIUM: Brand: KENDALL SCD

## (undated) DEVICE — SPONGE LAP 18X18 XRAY STRL

## (undated) DEVICE — 3M™ STERI-STRIP™ REINFORCED ADHESIVE SKIN CLOSURES, R1547, 1/2 IN X 4 IN (12 MM X 100 MM), 6 STRIPS/ENVELOPE: Brand: 3M™ STERI-STRIP™

## (undated) DEVICE — LIGASURE IMPACT OPEN DEVICE

## (undated) DEVICE — C SECTION PACK: Brand: MEDLINE INDUSTRIES, INC.

## (undated) DEVICE — ARISTA AH ABSORBABLE HEMOSTATIC PARTICLES: Brand: ARISTA™ AH

## (undated) DEVICE — COVER SGL STRL LGHT HNDL BLU

## (undated) DEVICE — SUTURE PLAIN GUT 2-0 CT

## (undated) DEVICE — NON-ADHERENT PAD PREPACK: Brand: TELFA

## (undated) NOTE — LETTER
4/20/2018              Yvonne Aracely Randall        [address] [id number]         Dear Kirk Miramontes,    It was a pleasure to see you. Your PAP test was normal.  There is no need for further testing at this time.   I look forward to s

## (undated) NOTE — Clinical Note
IMPRESSION: 1. IUP @  35w1d  2. Scan consistent with dates 3. No fetal structural abnormalities seen 4. Diabetes- managed by OB 5. Previous  x 2  RECOMMENDATIONS:  1.  NST twice weekly  2.   Plan delivery at 38-39wks